# Patient Record
Sex: MALE | Race: OTHER | NOT HISPANIC OR LATINO
[De-identification: names, ages, dates, MRNs, and addresses within clinical notes are randomized per-mention and may not be internally consistent; named-entity substitution may affect disease eponyms.]

---

## 2019-11-21 ENCOUNTER — FORM ENCOUNTER (OUTPATIENT)
Age: 44
End: 2019-11-21

## 2019-11-21 PROBLEM — Z00.00 ENCOUNTER FOR PREVENTIVE HEALTH EXAMINATION: Status: ACTIVE | Noted: 2019-11-21

## 2019-11-22 ENCOUNTER — APPOINTMENT (OUTPATIENT)
Dept: ORTHOPEDIC SURGERY | Facility: HOSPITAL | Age: 44
End: 2019-11-22
Payer: COMMERCIAL

## 2019-11-22 ENCOUNTER — OUTPATIENT (OUTPATIENT)
Dept: OUTPATIENT SERVICES | Facility: HOSPITAL | Age: 44
LOS: 1 days | End: 2019-11-22
Payer: COMMERCIAL

## 2019-11-22 VITALS
BODY MASS INDEX: 30.53 KG/M2 | HEIGHT: 66 IN | WEIGHT: 190 LBS | DIASTOLIC BLOOD PRESSURE: 90 MMHG | SYSTOLIC BLOOD PRESSURE: 140 MMHG

## 2019-11-22 PROCEDURE — 72100 X-RAY EXAM L-S SPINE 2/3 VWS: CPT

## 2019-11-22 PROCEDURE — 72084 X-RAY EXAM ENTIRE SPI 6/> VW: CPT | Mod: 26

## 2019-11-22 PROCEDURE — 99204 OFFICE O/P NEW MOD 45 MIN: CPT

## 2019-11-22 PROCEDURE — 72084 X-RAY EXAM ENTIRE SPI 6/> VW: CPT

## 2019-11-22 PROCEDURE — 72082 X-RAY EXAM ENTIRE SPI 2/3 VW: CPT

## 2019-12-04 ENCOUNTER — APPOINTMENT (OUTPATIENT)
Dept: ORTHOPEDIC SURGERY | Facility: CLINIC | Age: 44
End: 2019-12-04
Payer: COMMERCIAL

## 2019-12-04 ENCOUNTER — APPOINTMENT (OUTPATIENT)
Dept: PHYSICAL MEDICINE AND REHAB | Facility: CLINIC | Age: 44
End: 2019-12-04
Payer: COMMERCIAL

## 2019-12-04 VITALS — WEIGHT: 190 LBS | BODY MASS INDEX: 30.53 KG/M2 | HEART RATE: 73 BPM | OXYGEN SATURATION: 97 % | HEIGHT: 66 IN

## 2019-12-04 DIAGNOSIS — Z86.79 PERSONAL HISTORY OF OTHER DISEASES OF THE CIRCULATORY SYSTEM: ICD-10-CM

## 2019-12-04 DIAGNOSIS — Z78.9 OTHER SPECIFIED HEALTH STATUS: ICD-10-CM

## 2019-12-04 PROCEDURE — 99204 OFFICE O/P NEW MOD 45 MIN: CPT

## 2019-12-04 NOTE — PHYSICAL EXAM
[FreeTextEntry1] : GEN: AAOx3, NAD.\par PSYCH: Normal mood and affect. Responds appropriately to commands.\par EYES: Sclerae Anicteric. No discharge. EOMI.\par RESP: Breathing unlabored.\par CV: DP pulses 2+ and equal. No varicosities noted.\par EXT: No C/C/E.\par SKIN: No lesions noted.\par STRENGTH: 5/5 bilateral hip flexors, knee extensors, knee flexors, ankle dorsiflexors, long toe extensors, ankle plantar flexors, hip extensors, hip abductors.\par TONE: Normal, No clonus.\par REFLEXES: 2+ symmetric patella, medial hamstring, achilles. Plantars downgoing bilaterally.\par SENS: Grossly intact to light touch bilateral lower extremities.\par INSP: Spine alignment is midline, with no evidence of scoliosis.\par STANCE: No Trendelenburg with single leg stance.\par GAIT: Non antalgic, normal reciprocating heel to toe\par LUMBAR ROM: Flexion limited w/ radicular Sx. Extension, side-bending, rotation, oblique extension all full and pain free.  \par HIP ROM: Full and pain free bilaterally.\par PALP: There is no tenderness over the midline spinous processes, paravertebral muscles, SIJ, or greater trochanters bilaterally.\par SPECIAL: SLR and Slump (+) bilaterally. FADIR, UMA negative bilaterally.

## 2019-12-04 NOTE — ASSESSMENT
[FreeTextEntry1] : Impression:\par 1. Bilateral L5 Radiculopathy\par 2. Lumbar Spinal Stenosis\par \par Plan: After review of the history, physical examination, and imaging, the patient's symptoms are consistent with Bilateral L5 Radiculopathy, with underlying spinal stenosis. The imaging results and diagnosis were discussed in detail with the patient. We discussed all the potential treatment options including physical therapy, oral medication, interventional spine procedures, and surgery; as well as alternative therapeutics such as acupuncture and massage. We also discussed the importance of weight loss, ergonomics, and posture in the long term management of the condition. At this time the patient had tried significant conservative treatment without substantial improvement and is interested in interventional options for symptom reduction. I have offered the patient the option of an epidural steroid injection. We discussed all the risks, benefits, alternative treatments, and prognosis. The patient expressed understanding and would like to move forward. We will schedule for the injection as well as follow up post-procedure. We will plan for Bilateral L5  TFESI. The patient expressed verbal understanding and is in agreement with the plan of care. All of the patient's questions and concerns were addressed during today's visit.\par

## 2019-12-04 NOTE — HISTORY OF PRESENT ILLNESS
[FreeTextEntry1] : Mr. KEY NICOLE is a very pleasant 44 year male who comes in for evaluation of his lower back pan that has been ongoing for 5 years without any specific injury or inciting event. The pain is located primarily in the lower back radiating down bilateral legs/feet intermittent in nature and described as constant pain. The pain is rated as 2/10 during today's visit, and ranges from 5-10/10. The patient's symptoms are aggravated by walking and standing for a prolonged time and alleviated by sitting and resting .Patient has tried physical therapy which has helped and Advil for pain as needed. The patient denies any night pain, numbness/tingling, weakness, or bowel/bladder dysfunction. The patient has no other complaints at this time.\par \par

## 2019-12-04 NOTE — DATA REVIEWED
[FreeTextEntry1] : MR LS 11/2019: Disc bulging superimposed on a congenitally narrow canal contributes to moderate spinal canal stenosis at L2-3 and L3-4. Central L4-5 disc herniation narrowing lateral recesses left greater than right with mild spinal canal stenosis at this level.\par

## 2019-12-06 NOTE — DISCUSSION/SUMMARY
[de-identified] : Discussed the results of the patient's history, physical exam, and imaging. Mr. Vazquez has been suffering from low back pain that radiates posteriorly down both lower extremities to his heels for approximately 5 years. Neurologically intact. MRI lumbar shows multilevel disc bulges superimposed on congenitally narrow canal resulting in L2/L3 and L3/L4 moderate central canal stenosis, mild/moderate bilateral foraminal stenosis. Explained to patient this may require surgical decompression in the future. I am recommending an evaluation by Dr. Mcdaniels for consideration of a caudal injection vs selective bilateral L5 TESI, will see today. The patient will follow up with me in approximately 2 months, sooner if there is an issue.  All questions were answered.

## 2019-12-06 NOTE — HISTORY OF PRESENT ILLNESS
[de-identified] : Follow up 12/4/19: Patient continues to have moderate low back pain that radiates posteriorly down both lower extremities to his heels. Symptoms unchanged since last visit, denies new neurologic symptoms. Here to review imaging.

## 2019-12-06 NOTE — PHYSICAL EXAM
[de-identified] : General: patient is well developed, well nourished, in no acute \par distress, alert and oriented x 3. \par \par Mood and affect: normal\par \par Respiratory: no respiratory distress noted\par \par Skin: no scars over spine, skin intact, no erythema, increased warmth\par \par Alignment:The spine is well compensated in the coronal and sagittal plane.  There is no asymmetry on the wright forward bend test\par \par Gait: The patient is able to toe walk and heel walk without difficulty. The patient is able to tandem gait without difficulty.\par \par Palpation: no tenderness to palpation spine or paraspinal region\par \par Range of motion: Lumbar spine ROM is restricted\par \par Neurologic Exam:\par Motor: Manual Muscle testing in the lower extremities is 5 out of 5 in all muscle groups. There is no evidence of muscular atrophy in the lower extremities. Sensory: Sensation to light touch is grossly intact in the lower extremities\par \par Reflexes: DTR are present and symmetric throughout, negative black bilaterally, negative inverted radial reflex bilaterally, no clonus, plantar responses are flexor\par \par Hip Exam: No pain with internal or external rotation of hips bilaterally\par \par Special tests: Straight leg raise test negative.  Cross straight leg test negative.  UMA test negative\par \par Vascular: Examination of the peripheral vascular system demonstrates no evidence of congestion or edema. no evidence of lymphedema bilateral lower extremities, pulses are present and symmetric in both lower extremities.\par \par  [de-identified] : MRI lumbar 11/2019: multilevel disc bulges superimposed on congenitally narrow canal resulting in L2/L3 and L3/L4 moderate central canal stenosis, mild/moderate bilateral foraminal stenosis\par \par XR thoracolumbar 11/2019: mild multilevel degenerative changes, no spondylolisthesis or dynamic instability, mild S-shaped thoracolumbar scoliosis, no acute fractures

## 2019-12-13 ENCOUNTER — APPOINTMENT (OUTPATIENT)
Dept: PHYSICAL MEDICINE AND REHAB | Facility: CLINIC | Age: 44
End: 2019-12-13
Payer: COMMERCIAL

## 2019-12-13 PROCEDURE — 64483 NJX AA&/STRD TFRM EPI L/S 1: CPT | Mod: 50

## 2020-01-13 ENCOUNTER — APPOINTMENT (OUTPATIENT)
Dept: PHYSICAL MEDICINE AND REHAB | Facility: CLINIC | Age: 45
End: 2020-01-13
Payer: COMMERCIAL

## 2020-01-13 VITALS
OXYGEN SATURATION: 97 % | HEART RATE: 76 BPM | HEIGHT: 66 IN | BODY MASS INDEX: 30.53 KG/M2 | WEIGHT: 190 LBS | RESPIRATION RATE: 16 BRPM

## 2020-01-13 DIAGNOSIS — M51.17 INTERVERTEBRAL DISC DISORDERS WITH RADICULOPATHY, LUMBOSACRAL REGION: ICD-10-CM

## 2020-01-13 PROCEDURE — 99214 OFFICE O/P EST MOD 30 MIN: CPT

## 2020-02-06 NOTE — DISCUSSION/SUMMARY
[de-identified] : Discussed the results of the patient's history, physical exam, and imaging. Mr. Vazquez has been suffering from low back pain that radiates posteriorly down both lower extremities to his heels for approximately 5 years. Neurologically intact. I am recommending an MRI lumbar without contrast, order given. The patient will follow up with Dr. Carrillo and myself after imaging is completed, sooner if there is an issue. All questions answered.

## 2020-02-06 NOTE — HISTORY OF PRESENT ILLNESS
[de-identified] : Mr. NICOLE is a very pleasant 44 year old male who complains of low back pain for 5 years, atraumatic. On initial presentation symptoms were as follows: Patient described the pain as constant.  Patient rated the pain as moderate.  The pain localized to lower back.  The pain radiates posteriorly down both lower extremities to her heels.  Patient  denies extremity numbness and paresthesias. \par \par The patient reports no loss of hand dexterity.\par The patient states there no loss of balance when walking.\par There no sensory loss in the arms or legs\par The patent no difficulty with urination.\par \par The patient no history of previous spine surgery.\par \par The patient has no history of unexpected weight loss, no history of active cancer, no history bladder or bowel dysfunction, no night pain, no fevers or chills.\par \par The past medical history, surgical history, family history, allergies, medications, 10+ point review of systems, family history and social history were reviewed and non contributory.\par \par \par  \par

## 2020-02-06 NOTE — PHYSICAL EXAM
[de-identified] : General: patient is well developed, well nourished, in no acute \par distress, alert and oriented x 3. \par \par Mood and affect: normal\par \par Respiratory: no respiratory distress noted\par \par Skin: no scars over spine, skin intact, no erythema, increased warmth\par \par Alignment:The spine is well compensated in the coronal and sagittal plane. There is no asymmetry on the wright forward bend test\par \par Gait: The patient is able to toe walk and heel walk without difficulty. The patient is able to tandem gait without difficulty.\par \par Palpation: no tenderness to palpation spine or paraspinal region\par \par Range of motion: Lumbar spine ROM is restricted\par \par Neurologic Exam:\par Motor: Manual Muscle testing in the lower extremities is 5 out of 5 in all muscle groups. There is no evidence of muscular atrophy in the lower extremities. Sensory: Sensation to light touch is grossly intact in the lower extremities\par \par Reflexes: DTR are present and symmetric throughout, negative black bilaterally, negative inverted radial reflex bilaterally, no clonus, plantar responses are flexor\par \par Hip Exam: No pain with internal or external rotation of hips bilaterally\par \par Special tests: Straight leg raise test negative. Cross straight leg test negative. UMA test negative\par \par Vascular: Examination of the peripheral vascular system demonstrates no evidence of congestion or edema. no evidence of lymphedema bilateral lower extremities, pulses are present and symmetric in both lower extremities.\par  [de-identified] : XR thoracolumbar 11/2019: mild multilevel degenerative changes, no spondylolisthesis or dynamic instability, mild S-shaped thoracolumbar scoliosis, no acute fractures. \par

## 2020-05-05 ENCOUNTER — APPOINTMENT (OUTPATIENT)
Dept: ORTHOPEDIC SURGERY | Facility: CLINIC | Age: 45
End: 2020-05-05
Payer: COMMERCIAL

## 2020-05-05 PROCEDURE — 99215 OFFICE O/P EST HI 40 MIN: CPT | Mod: 57

## 2020-05-05 NOTE — PHYSICAL EXAM
[de-identified] : General: patient is well developed, well nourished, in no acute \par distress, alert and oriented x 3. \par \par Mood and affect: normal\par \par Respiratory: no respiratory distress noted\par \par Skin: no scars over spine, skin intact, no erythema, increased warmth\par \par Alignment:The spine is well compensated in the coronal and sagittal plane. There is no asymmetry on the wright forward bend test\par \par Gait: The patient is able to toe walk and heel walk without difficulty. The patient is able to tandem gait without difficulty.\par \par Palpation: no tenderness to palpation spine or paraspinal region\par \par Range of motion: Lumbar spine ROM is restricted\par \par Neurologic Exam:\par Motor: Manual Muscle testing in the lower extremities is 5 out of 5 in all muscle groups. There is no evidence of muscular atrophy in the lower extremities. Sensory: Sensation to light touch is grossly intact in the lower extremities\par \par Reflexes: DTR are present and symmetric throughout, negative black bilaterally, negative inverted radial reflex bilaterally, no clonus, plantar responses are flexor\par \par Hip Exam: No pain with internal or external rotation of hips bilaterally\par \par Special tests: Straight leg raise test negative. Cross straight leg test negative. UMA test negative\par \par Vascular: Examination of the peripheral vascular system demonstrates no evidence of congestion or edema. no evidence of lymphedema bilateral lower extremities, pulses are present and symmetric in both lower extremities. [de-identified] : MRI lumbar 11/2019: multilevel disc bulges superimposed on congenitally narrow canal resulting in L2/L3 and L3/L4 moderate central canal stenosis, mild/moderate bilateral foraminal stenosis\par \par XR thoracolumbar 11/2019: mild multilevel degenerative changes, no spondylolisthesis or dynamic instability, mild S-shaped thoracolumbar scoliosis, no acute fractures.

## 2020-05-05 NOTE — DISCUSSION/SUMMARY
[de-identified] : Discussed the results of the patient's history, physical exam, and imaging. Mr. Vazquez has been suffering from low back pain that radiates posteriorly down both lower extremities to his heels for approximately 5 years. Now worsening and severe.  unable to work.  difficulty with ADLs.  MRI lumbar shows multilevel disc bulges superimposed on congenitally narrow canal resulting in L2/L3 and L3/L4 moderate central canal stenosis, mild/moderate bilateral foraminal stenosis. There is a disc herniation at L4/5 with compression of the bilateral L5 nerve roots additionally.  He had temporary 100% relief from TESI.  He does not wish to have any more injections.  wishes to proceed with surgery.  The patient is a candidate for L2-5 Laminectomy, partial facetectomy, possible fusion, instrumentation, navigation.  Further non operative treatment would include no surgery.  Risks, benefits, alternatives were discussed with the patient at great length, including but not limited to, bleeding, infection, persistent neurologic deficit, worsening pain, worsening neurologic status, pseudoarthrosis, hardware migration/failure, medical complications (including but not limited to cardiac, pulmonary, renal), and the need for further surgery.  The patient asked a number of cogent questions.  All questions were answered.  The patient demonstrated understanding of the risks, benefits, and alternatives.  The patient has elected to proceed with surgery.  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  He understands and still agrees to proceed.

## 2020-05-05 NOTE — HISTORY OF PRESENT ILLNESS
[de-identified] : Follow up 5/5/20: Patient continues to have severe low back pain that radiates posteriorly down both lower extremities to his heels, much worse on the right.  feels numbness and tingling in the right lower extremity down to the foot when he walks. Had injection with dr. jones, bilateral L5 TESI with 100% relief for 1-2 months.  This was done in 12/2019.  reports that the symptoms have worsened and now are worse than pre-injection.  unable to work due to pain.\par \par The patient reports no loss of hand dexterity.\par The patient states there no loss of balance when walking.\par The patent no difficulty with urination.\par \par The patient no history of previous spine surgery.\par \par The patient has no history of unexpected weight loss, no history of active cancer, no history bladder or bowel dysfunction, no night pain, no fevers or chills.\par \par The past medical history, surgical history, family history, allergies, medications, review of systems, family history and social history were reviewed and non contributory.

## 2020-07-29 ENCOUNTER — OUTPATIENT (OUTPATIENT)
Dept: OUTPATIENT SERVICES | Facility: HOSPITAL | Age: 45
LOS: 1 days | End: 2020-07-29
Payer: COMMERCIAL

## 2020-07-29 ENCOUNTER — RESULT REVIEW (OUTPATIENT)
Age: 45
End: 2020-07-29

## 2020-07-29 PROCEDURE — 71046 X-RAY EXAM CHEST 2 VIEWS: CPT | Mod: 26

## 2020-07-29 PROCEDURE — 71046 X-RAY EXAM CHEST 2 VIEWS: CPT

## 2020-08-14 ENCOUNTER — APPOINTMENT (OUTPATIENT)
Dept: ORTHOPEDIC SURGERY | Facility: HOSPITAL | Age: 45
End: 2020-08-14

## 2021-08-18 ENCOUNTER — APPOINTMENT (OUTPATIENT)
Dept: ORTHOPEDIC SURGERY | Facility: CLINIC | Age: 46
End: 2021-08-18
Payer: COMMERCIAL

## 2021-08-18 ENCOUNTER — NON-APPOINTMENT (OUTPATIENT)
Age: 46
End: 2021-08-18

## 2021-08-18 DIAGNOSIS — R20.2 PARESTHESIA OF SKIN: ICD-10-CM

## 2021-08-18 PROCEDURE — 99214 OFFICE O/P EST MOD 30 MIN: CPT

## 2021-08-30 NOTE — DISCUSSION/SUMMARY
[de-identified] : Discussed my findings with the patient. Mr. Vazquez reports worsening low back pain and bilateral lower extremity radiating pain over the past year. Patient was scheduled for an L2-L5 laminectomy with possible fusion with instrumentation last year. Patient cancelled as wanted to continue conservative management. Symptoms now significantly disabling, would like to consider surgical intervention. As imaging is now over 1 year old, recommending updated XR lumbar and MRI lumbar without contrast, orders given. Patient also reports bilateral hand paresthesias and gait instability. Recommending MRI cervical and thoracic without contrast prior to surgical intervention for lumbar spine to rule out cord compression, orders given. Patient will follow up with me after imaging is completed, sooner if there is an issue. All questions answered.

## 2021-08-30 NOTE — PHYSICAL EXAM
[de-identified] : General: patient is well developed, well nourished, in no acute \par distress, alert and oriented x 3. \par \par Mood and affect: normal\par \par Respiratory: no respiratory distress noted\par \par Skin: no scars over spine, skin intact, no erythema, increased warmth\par \par Alignment:The spine is well compensated in the coronal and sagittal plane. There is no asymmetry on the wright forward bend test\par \par Gait: The patient is able to toe walk and heel walk without difficulty. The patient is able to tandem gait without difficulty.\par \par Palpation: no tenderness to palpation spine or paraspinal region\par \par Range of motion: Lumbar spine ROM is restricted\par \par Neurologic Exam:\par Motor: Manual Muscle testing in the lower extremities is 5 out of 5 in all muscle groups. There is no evidence of muscular atrophy in the lower extremities. Sensory: Sensation to light touch is grossly intact in the lower extremities\par \par Reflexes: DTR are present and symmetric throughout, negative black bilaterally, negative inverted radial reflex bilaterally, no clonus, plantar responses are flexor\par \par Hip Exam: No pain with internal or external rotation of hips bilaterally\par \par Special tests: Straight leg raise test negative. Cross straight leg test negative. UMA test negative\par \par Vascular: Examination of the peripheral vascular system demonstrates no evidence of congestion or edema. no evidence of lymphedema bilateral lower extremities, pulses are present and symmetric in both lower extremities.\par  [de-identified] : MRI lumbar 11/2019: multilevel disc bulges superimposed on congenitally narrow canal resulting in L2/L3 and L3/L4 moderate central canal stenosis, mild/moderate bilateral foraminal stenosis\par \par XR thoracolumbar 11/2019: mild multilevel degenerative changes, no spondylolisthesis or dynamic instability, mild S-shaped thoracolumbar scoliosis, no acute fractures. \par

## 2021-08-30 NOTE — HISTORY OF PRESENT ILLNESS
[de-identified] : Follow up 8/18/21: Patient presents for follow up. He reports low back pain that radiates posteriorly down both lower extremities to his heels. Also with bilateral groin pain. Lower extremity pain more severe than low back pain. Pain gradually worsening over the past year. Also reports paresthesias in both hands over the past several months. Significantly disabled by symptoms. Works construction, current symptoms interfering with work. \par \par Follow up 5/2020: Mr. NICOLE is a very pleasant 44 year old male who complains of low back pain for 5 years, atraumatic. On initial presentation symptoms were as follows: Patient described the pain as constant.  Patient rated the pain as moderate.  The pain localized to lower back.  The pain radiates posteriorly down both lower extremities to his heels.  Patient  denies extremity numbness and paresthesias. \par \par The patient reports no loss of hand dexterity.\par The patient states there no loss of balance when walking.\par There no sensory loss in the arms or legs\par The patent no difficulty with urination.\par \par The patient no history of previous spine surgery.\par \par The patient has no history of unexpected weight loss, no history of active cancer, no history bladder or bowel dysfunction, no night pain, no fevers or chills.\par \par The past medical history, surgical history, family history, allergies, medications, 10+ point review of systems, family history and social history were reviewed and non contributory.\par \par \par  \par

## 2021-09-15 ENCOUNTER — TRANSCRIPTION ENCOUNTER (OUTPATIENT)
Age: 46
End: 2021-09-15

## 2021-09-15 ENCOUNTER — APPOINTMENT (OUTPATIENT)
Dept: ORTHOPEDIC SURGERY | Facility: CLINIC | Age: 46
End: 2021-09-15
Payer: COMMERCIAL

## 2021-09-15 DIAGNOSIS — G89.29 LUMBAGO WITH SCIATICA, LEFT SIDE: ICD-10-CM

## 2021-09-15 DIAGNOSIS — M54.42 LUMBAGO WITH SCIATICA, LEFT SIDE: ICD-10-CM

## 2021-09-15 DIAGNOSIS — M54.41 LUMBAGO WITH SCIATICA, LEFT SIDE: ICD-10-CM

## 2021-09-15 DIAGNOSIS — Z01.818 ENCOUNTER FOR OTHER PREPROCEDURAL EXAMINATION: ICD-10-CM

## 2021-09-15 PROCEDURE — 99215 OFFICE O/P EST HI 40 MIN: CPT | Mod: 57

## 2021-09-16 LAB
MRSA SPEC QL CULT: NOT DETECTED
STAPH AUREUS (SA): NOT DETECTED

## 2021-09-28 NOTE — PHYSICAL EXAM
[de-identified] : General: patient is well developed, well nourished, in no acute \par distress, alert and oriented x 3. \par \par Mood and affect: normal\par \par Respiratory: no respiratory distress noted\par \par Skin: no scars over spine, skin intact, no erythema, increased warmth\par \par Alignment:The spine is well compensated in the coronal and sagittal plane. There is no asymmetry on the wright forward bend test\par \par Gait: The patient is able to toe walk and heel walk without difficulty. The patient is able to tandem gait without difficulty.\par \par Palpation: no tenderness to palpation spine or paraspinal region\par \par Range of motion: Lumbar spine ROM is restricted\par \par Neurologic Exam:\par Motor: Manual Muscle testing in the lower extremities is 5 out of 5 in all muscle groups. There is no evidence of muscular atrophy in the lower extremities. Sensory: Sensation to light touch is grossly intact in the lower extremities\par \par Reflexes: DTR are present and symmetric throughout, negative black bilaterally, negative inverted radial reflex bilaterally, no clonus, plantar responses are flexor\par \par Hip Exam: No pain with internal or external rotation of hips bilaterally\par \par Special tests: Straight leg raise test negative. Cross straight leg test negative. UMA test negative\par \par Vascular: Examination of the peripheral vascular system demonstrates no evidence of congestion or edema. no evidence of lymphedema bilateral lower extremities, pulses are present and symmetric in both lower extremities.\par  [de-identified] : MRI lumbar 9/2021: multilevel disc bulges and posterior element hypertrophy with L2/L3 mild central and moderate bilateral foraminal stenosis; L3/L4 and L4/L5 mild/moderate central and moderate/severe bilateral foraminal stenosis\par \par MRI lumbar 11/2019: multilevel disc bulges superimposed on congenitally narrow canal resulting in L2/L3 and L3/L4 moderate central canal stenosis, mild/moderate bilateral foraminal stenosis\par \par XR thoracolumbar 11/2019: mild multilevel degenerative changes, no spondylolisthesis or dynamic instability, mild S-shaped thoracolumbar scoliosis, no acute fractures. \par

## 2021-09-28 NOTE — DISCUSSION/SUMMARY
[de-identified] : Discussed my findings with the patient. Mr. Vazquez reports worsening low back pain and bilateral lower extremity radiating pain, progressing over the past year. 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 Has failed extensive conservative management, including physical therapy, steroid injections, and oral medication. The patient is a candidate for L2-L5 laminectomy, possible posterior spinal fusion with instrumentation.  Given his congenitally narrow spinal canal and narrowly placed facets, in concert with his severe spinal stenosis, the necessary neural decompression may require resection of more than 50% of the facet joints.  This would create iatrogenic instability and would require spinal fusion and instrumentation at the same time.  Further non operative treatment would include no surgery.  Risks, benefits, alternatives were discussed with the patient at great length, including but not limited to, bleeding, infection, persistent neurologic deficit, worsening pain, worsening neurologic status, pseudoarthrosis, hardware migration/failure, cerebrospinal fluid leak, medical complications (including but not limited to cardiac, pulmonary, renal), and the need for further surgery.  The patient asked a number of cogent questions.  All questions were answered.  The patient demonstrated understanding of the risks, benefits, and alternatives.  The patient has elected to proceed with surgery. He will need medical clearance. He will need a CT lumbar without contrast for preoperative planning.\par \par

## 2021-09-28 NOTE — HISTORY OF PRESENT ILLNESS
[de-identified] : Follow up 9/15/21: Patient presents for follow up. Continues to have low back pain that radiates primarily laterally down both lower extremities to his heels. Also with bilateral groin pain. Lower extremity pain more severe than low back pain. Significantly disabled by symptoms. Works construction, current symptoms interfering with work and ADLs. Denies new neurologic symptoms. Here to review updated imaging. \par

## 2021-10-05 ENCOUNTER — LABORATORY RESULT (OUTPATIENT)
Age: 46
End: 2021-10-05

## 2021-10-05 ENCOUNTER — OUTPATIENT (OUTPATIENT)
Dept: OUTPATIENT SERVICES | Facility: HOSPITAL | Age: 46
LOS: 1 days | End: 2021-10-05
Payer: COMMERCIAL

## 2021-10-05 ENCOUNTER — RESULT REVIEW (OUTPATIENT)
Age: 46
End: 2021-10-05

## 2021-10-05 PROCEDURE — 71046 X-RAY EXAM CHEST 2 VIEWS: CPT | Mod: 26

## 2021-10-05 PROCEDURE — 71046 X-RAY EXAM CHEST 2 VIEWS: CPT

## 2021-10-07 ENCOUNTER — TRANSCRIPTION ENCOUNTER (OUTPATIENT)
Age: 46
End: 2021-10-07

## 2021-10-07 VITALS
HEIGHT: 67 IN | TEMPERATURE: 97 F | WEIGHT: 212.08 LBS | SYSTOLIC BLOOD PRESSURE: 144 MMHG | HEART RATE: 70 BPM | OXYGEN SATURATION: 97 % | DIASTOLIC BLOOD PRESSURE: 82 MMHG | RESPIRATION RATE: 16 BRPM

## 2021-10-07 RX ORDER — POVIDONE-IODINE 5 %
1 AEROSOL (ML) TOPICAL ONCE
Refills: 0 | Status: DISCONTINUED | OUTPATIENT
Start: 2021-10-08 | End: 2021-10-08

## 2021-10-07 NOTE — H&P ADULT - NSHPLABSRESULTS_GEN_ALL_CORE
COVID PCR neg 10/5 COVID PCR neg 10/5  Preop cbc, bmp, pt/inr, ptt, ua wnl  Cr 0.9  preop cxr wnl per clearance  preop ekg wnl per clearance  3M DOS

## 2021-10-07 NOTE — H&P ADULT - PROBLEM SELECTOR PLAN 1
Admit to Ortho  For elective L2-5 Lami possible fusion.  Cleared by Admit to Ortho  For elective L2-5 Lami possible fusion.  Cleared by Dr. Márquez

## 2021-10-07 NOTE — H&P ADULT - NSHPPHYSICALEXAM_GEN_ALL_CORE
Back decreased ROM 2/2 pain  Rest of PE per MD clearance Lumbar spine decreased ROM 2/2 pain  Skin warm and well perfused, no visible wounds/erythema/ecchymoses  EHL/FHL/TA/GS 5/5 motor strength bilateral lower extremities   SLT in tact to distal bilateral lower extremities   DP pulses palpable bilaterally   Remainder of PE per MD clearance

## 2021-10-07 NOTE — H&P ADULT - NSICDXPASTMEDICALHX_GEN_ALL_CORE_FT
PAST MEDICAL HISTORY:  H/O low back pain     HLD (hyperlipidemia)     HTN (hypertension)     Spinal stenosis

## 2021-10-08 ENCOUNTER — INPATIENT (INPATIENT)
Facility: HOSPITAL | Age: 46
LOS: 3 days | Discharge: HOME | DRG: 519 | End: 2021-10-12
Attending: ORTHOPAEDIC SURGERY | Admitting: ORTHOPAEDIC SURGERY
Payer: COMMERCIAL

## 2021-10-08 ENCOUNTER — APPOINTMENT (OUTPATIENT)
Dept: ORTHOPEDIC SURGERY | Facility: HOSPITAL | Age: 46
End: 2021-10-08

## 2021-10-08 DIAGNOSIS — M48.061 SPINAL STENOSIS, LUMBAR REGION WITHOUT NEUROGENIC CLAUDICATION: ICD-10-CM

## 2021-10-08 DIAGNOSIS — Z90.49 ACQUIRED ABSENCE OF OTHER SPECIFIED PARTS OF DIGESTIVE TRACT: Chronic | ICD-10-CM

## 2021-10-08 LAB
BLD GP AB SCN SERPL QL: NEGATIVE — SIGNIFICANT CHANGE UP
RH IG SCN BLD-IMP: POSITIVE — SIGNIFICANT CHANGE UP

## 2021-10-08 PROCEDURE — 63048 LAM FACETEC &FORAMOT EA ADDL: CPT

## 2021-10-08 PROCEDURE — 63047 LAM FACETEC & FORAMOT LUMBAR: CPT

## 2021-10-08 RX ORDER — CHOLECALCIFEROL (VITAMIN D3) 125 MCG
1 CAPSULE ORAL
Qty: 0 | Refills: 0 | DISCHARGE

## 2021-10-08 RX ORDER — HYDROMORPHONE HYDROCHLORIDE 2 MG/ML
0.5 INJECTION INTRAMUSCULAR; INTRAVENOUS; SUBCUTANEOUS
Refills: 0 | Status: DISCONTINUED | OUTPATIENT
Start: 2021-10-08 | End: 2021-10-12

## 2021-10-08 RX ORDER — APREPITANT 80 MG/1
40 CAPSULE ORAL ONCE
Refills: 0 | Status: COMPLETED | OUTPATIENT
Start: 2021-10-08 | End: 2021-10-08

## 2021-10-08 RX ORDER — BUPIVACAINE 13.3 MG/ML
20 INJECTION, SUSPENSION, LIPOSOMAL INFILTRATION ONCE
Refills: 0 | Status: DISCONTINUED | OUTPATIENT
Start: 2021-10-08 | End: 2021-10-08

## 2021-10-08 RX ORDER — GABAPENTIN 400 MG/1
300 CAPSULE ORAL ONCE
Refills: 0 | Status: COMPLETED | OUTPATIENT
Start: 2021-10-08 | End: 2021-10-08

## 2021-10-08 RX ORDER — METHOCARBAMOL 500 MG/1
500 TABLET, FILM COATED ORAL EVERY 8 HOURS
Refills: 0 | Status: DISCONTINUED | OUTPATIENT
Start: 2021-10-08 | End: 2021-10-12

## 2021-10-08 RX ORDER — INFLUENZA VIRUS VACCINE 15; 15; 15; 15 UG/.5ML; UG/.5ML; UG/.5ML; UG/.5ML
0.5 SUSPENSION INTRAMUSCULAR ONCE
Refills: 0 | Status: DISCONTINUED | OUTPATIENT
Start: 2021-10-08 | End: 2021-10-08

## 2021-10-08 RX ORDER — ACETAMINOPHEN 500 MG
1000 TABLET ORAL ONCE
Refills: 0 | Status: COMPLETED | OUTPATIENT
Start: 2021-10-08 | End: 2021-10-08

## 2021-10-08 RX ORDER — SODIUM CHLORIDE 9 MG/ML
1000 INJECTION, SOLUTION INTRAVENOUS
Refills: 0 | Status: DISCONTINUED | OUTPATIENT
Start: 2021-10-08 | End: 2021-10-12

## 2021-10-08 RX ORDER — SENNA PLUS 8.6 MG/1
2 TABLET ORAL AT BEDTIME
Refills: 0 | Status: DISCONTINUED | OUTPATIENT
Start: 2021-10-08 | End: 2021-10-12

## 2021-10-08 RX ORDER — AZILSARTAN KAMEDOXOMIL 40 MG/1
1 TABLET ORAL
Qty: 0 | Refills: 0 | DISCHARGE

## 2021-10-08 RX ORDER — MAGNESIUM HYDROXIDE 400 MG/1
30 TABLET, CHEWABLE ORAL DAILY
Refills: 0 | Status: DISCONTINUED | OUTPATIENT
Start: 2021-10-08 | End: 2021-10-12

## 2021-10-08 RX ORDER — CEFAZOLIN SODIUM 1 G
2000 VIAL (EA) INJECTION EVERY 8 HOURS
Refills: 0 | Status: DISCONTINUED | OUTPATIENT
Start: 2021-10-08 | End: 2021-10-08

## 2021-10-08 RX ORDER — ACETAMINOPHEN 500 MG
975 TABLET ORAL EVERY 8 HOURS
Refills: 0 | Status: DISCONTINUED | OUTPATIENT
Start: 2021-10-08 | End: 2021-10-11

## 2021-10-08 RX ORDER — FAMOTIDINE 10 MG/ML
20 INJECTION INTRAVENOUS EVERY 12 HOURS
Refills: 0 | Status: DISCONTINUED | OUTPATIENT
Start: 2021-10-08 | End: 2021-10-12

## 2021-10-08 RX ORDER — HYDROMORPHONE HYDROCHLORIDE 2 MG/ML
0.5 INJECTION INTRAMUSCULAR; INTRAVENOUS; SUBCUTANEOUS EVERY 4 HOURS
Refills: 0 | Status: DISCONTINUED | OUTPATIENT
Start: 2021-10-08 | End: 2021-10-12

## 2021-10-08 RX ORDER — OXYCODONE HYDROCHLORIDE 5 MG/1
5 TABLET ORAL EVERY 4 HOURS
Refills: 0 | Status: DISCONTINUED | OUTPATIENT
Start: 2021-10-08 | End: 2021-10-10

## 2021-10-08 RX ORDER — FOLIC ACID 0.8 MG
1 TABLET ORAL DAILY
Refills: 0 | Status: DISCONTINUED | OUTPATIENT
Start: 2021-10-08 | End: 2021-10-12

## 2021-10-08 RX ORDER — ONDANSETRON 8 MG/1
4 TABLET, FILM COATED ORAL EVERY 6 HOURS
Refills: 0 | Status: DISCONTINUED | OUTPATIENT
Start: 2021-10-08 | End: 2021-10-12

## 2021-10-08 RX ORDER — OXYCODONE HYDROCHLORIDE 5 MG/1
10 TABLET ORAL EVERY 4 HOURS
Refills: 0 | Status: DISCONTINUED | OUTPATIENT
Start: 2021-10-08 | End: 2021-10-10

## 2021-10-08 RX ORDER — ICOSAPENT ETHYL 500 MG/1
2 CAPSULE, LIQUID FILLED ORAL
Qty: 0 | Refills: 0 | DISCHARGE

## 2021-10-08 RX ORDER — CHLORHEXIDINE GLUCONATE 213 G/1000ML
1 SOLUTION TOPICAL ONCE
Refills: 0 | Status: COMPLETED | OUTPATIENT
Start: 2021-10-08 | End: 2021-10-08

## 2021-10-08 RX ORDER — CEFAZOLIN SODIUM 1 G
2000 VIAL (EA) INJECTION EVERY 8 HOURS
Refills: 0 | Status: COMPLETED | OUTPATIENT
Start: 2021-10-08 | End: 2021-10-08

## 2021-10-08 RX ADMIN — METHOCARBAMOL 500 MILLIGRAM(S): 500 TABLET, FILM COATED ORAL at 21:52

## 2021-10-08 RX ADMIN — Medication 50 MILLIGRAM(S): at 21:52

## 2021-10-08 RX ADMIN — Medication 1000 MILLIGRAM(S): at 07:26

## 2021-10-08 RX ADMIN — Medication 100 MILLIGRAM(S): at 23:55

## 2021-10-08 RX ADMIN — SENNA PLUS 2 TABLET(S): 8.6 TABLET ORAL at 21:52

## 2021-10-08 RX ADMIN — FAMOTIDINE 20 MILLIGRAM(S): 10 INJECTION INTRAVENOUS at 17:08

## 2021-10-08 RX ADMIN — Medication 100 MILLIGRAM(S): at 17:07

## 2021-10-08 RX ADMIN — Medication 975 MILLIGRAM(S): at 22:52

## 2021-10-08 RX ADMIN — APREPITANT 40 MILLIGRAM(S): 80 CAPSULE ORAL at 07:25

## 2021-10-08 RX ADMIN — CHLORHEXIDINE GLUCONATE 1 APPLICATION(S): 213 SOLUTION TOPICAL at 07:14

## 2021-10-08 RX ADMIN — Medication 975 MILLIGRAM(S): at 14:14

## 2021-10-08 RX ADMIN — Medication 975 MILLIGRAM(S): at 21:52

## 2021-10-08 RX ADMIN — METHOCARBAMOL 500 MILLIGRAM(S): 500 TABLET, FILM COATED ORAL at 17:08

## 2021-10-08 RX ADMIN — GABAPENTIN 300 MILLIGRAM(S): 400 CAPSULE ORAL at 07:26

## 2021-10-08 NOTE — PHYSICAL THERAPY INITIAL EVALUATION ADULT - PLANNED THERAPY INTERVENTIONS, PT EVAL
balance training/bed mobility training/gait training/manual therapy techniques/neuromuscular re-education/postural re-education/strengthening/stretching/transfer training

## 2021-10-08 NOTE — PROGRESS NOTE ADULT - SUBJECTIVE AND OBJECTIVE BOX
Orthopaedics Post Op Check    Procedure: L2-L5 Laminectomy  Surgeon: Dr. Carrillo     Pt comfortable, without complaints  Denies CP, SOB, N/V, numbness/tingling     Vital Signs Last 24 Hrs  T(C): 36.2 (08 Oct 2021 12:30), Max: 36.2 (08 Oct 2021 12:30)  T(F): 97.2 (08 Oct 2021 12:30), Max: 97.2 (08 Oct 2021 12:30)  HR: 84 (08 Oct 2021 12:45) (82 - 84)  BP: 113/58 (08 Oct 2021 12:45) (113/58 - 116/58)  BP(mean): 82 (08 Oct 2021 12:45) (80 - 84)  RR: 13 (08 Oct 2021 12:45) (13 - 14)  SpO2: 99% (08 Oct 2021 12:45) (99% - 100%)  AVSS, NAD    Dressing C/D/I; HV x 1 holding suction  General: Pt Alert and oriented     Pulses: DP pulses palpable bilaterally   Sensation: SLT in tact to distal bilateral lower extremities   Motor: EHL/FHL/TA/GS 5/5 motor strength bilateral lower extremities          Post op XR: fluoroscopy utilized intra op to confirm level     A/P: 46yMale POD#0 s/p Lami L2-L5  - Stable  - Pain Control  - DVT ppx: SCDs  - Post op abx: Ancef  - PT, WBS: WBAT  - F/U AM Labs

## 2021-10-09 ENCOUNTER — TRANSCRIPTION ENCOUNTER (OUTPATIENT)
Age: 46
End: 2021-10-09

## 2021-10-09 LAB
ANION GAP SERPL CALC-SCNC: 9 MMOL/L — SIGNIFICANT CHANGE UP (ref 5–17)
BUN SERPL-MCNC: 13 MG/DL — SIGNIFICANT CHANGE UP (ref 7–23)
CALCIUM SERPL-MCNC: 9.2 MG/DL — SIGNIFICANT CHANGE UP (ref 8.4–10.5)
CHLORIDE SERPL-SCNC: 107 MMOL/L — SIGNIFICANT CHANGE UP (ref 96–108)
CO2 SERPL-SCNC: 25 MMOL/L — SIGNIFICANT CHANGE UP (ref 22–31)
COVID-19 SPIKE DOMAIN AB INTERP: POSITIVE
COVID-19 SPIKE DOMAIN ANTIBODY RESULT: >250 U/ML — HIGH
CREAT SERPL-MCNC: 0.76 MG/DL — SIGNIFICANT CHANGE UP (ref 0.5–1.3)
GLUCOSE SERPL-MCNC: 103 MG/DL — HIGH (ref 70–99)
HCT VFR BLD CALC: 35.6 % — LOW (ref 39–50)
HGB BLD-MCNC: 11.5 G/DL — LOW (ref 13–17)
MCHC RBC-ENTMCNC: 29 PG — SIGNIFICANT CHANGE UP (ref 27–34)
MCHC RBC-ENTMCNC: 32.3 GM/DL — SIGNIFICANT CHANGE UP (ref 32–36)
MCV RBC AUTO: 89.9 FL — SIGNIFICANT CHANGE UP (ref 80–100)
NRBC # BLD: 0 /100 WBCS — SIGNIFICANT CHANGE UP (ref 0–0)
PLATELET # BLD AUTO: 212 K/UL — SIGNIFICANT CHANGE UP (ref 150–400)
POTASSIUM SERPL-MCNC: 3.5 MMOL/L — SIGNIFICANT CHANGE UP (ref 3.5–5.3)
POTASSIUM SERPL-SCNC: 3.5 MMOL/L — SIGNIFICANT CHANGE UP (ref 3.5–5.3)
RBC # BLD: 3.96 M/UL — LOW (ref 4.2–5.8)
RBC # FLD: 12.2 % — SIGNIFICANT CHANGE UP (ref 10.3–14.5)
SARS-COV-2 IGG+IGM SERPL QL IA: >250 U/ML — HIGH
SARS-COV-2 IGG+IGM SERPL QL IA: POSITIVE
SODIUM SERPL-SCNC: 141 MMOL/L — SIGNIFICANT CHANGE UP (ref 135–145)
WBC # BLD: 10.08 K/UL — SIGNIFICANT CHANGE UP (ref 3.8–10.5)
WBC # FLD AUTO: 10.08 K/UL — SIGNIFICANT CHANGE UP (ref 3.8–10.5)

## 2021-10-09 RX ORDER — METHOCARBAMOL 500 MG/1
1 TABLET, FILM COATED ORAL
Qty: 21 | Refills: 0
Start: 2021-10-09 | End: 2021-10-15

## 2021-10-09 RX ORDER — OXYCODONE HYDROCHLORIDE 5 MG/1
1 TABLET ORAL
Qty: 42 | Refills: 0
Start: 2021-10-09 | End: 2021-10-15

## 2021-10-09 RX ORDER — ACETAMINOPHEN 500 MG
2 TABLET ORAL
Qty: 100 | Refills: 0
Start: 2021-10-09

## 2021-10-09 RX ADMIN — Medication 975 MILLIGRAM(S): at 06:04

## 2021-10-09 RX ADMIN — SENNA PLUS 2 TABLET(S): 8.6 TABLET ORAL at 22:09

## 2021-10-09 RX ADMIN — Medication 50 MILLIGRAM(S): at 14:25

## 2021-10-09 RX ADMIN — METHOCARBAMOL 500 MILLIGRAM(S): 500 TABLET, FILM COATED ORAL at 22:08

## 2021-10-09 RX ADMIN — METHOCARBAMOL 500 MILLIGRAM(S): 500 TABLET, FILM COATED ORAL at 06:04

## 2021-10-09 RX ADMIN — Medication 975 MILLIGRAM(S): at 07:04

## 2021-10-09 RX ADMIN — METHOCARBAMOL 500 MILLIGRAM(S): 500 TABLET, FILM COATED ORAL at 14:25

## 2021-10-09 RX ADMIN — Medication 1 TABLET(S): at 12:07

## 2021-10-09 RX ADMIN — FAMOTIDINE 20 MILLIGRAM(S): 10 INJECTION INTRAVENOUS at 06:04

## 2021-10-09 RX ADMIN — FAMOTIDINE 20 MILLIGRAM(S): 10 INJECTION INTRAVENOUS at 17:28

## 2021-10-09 RX ADMIN — Medication 975 MILLIGRAM(S): at 22:08

## 2021-10-09 RX ADMIN — Medication 975 MILLIGRAM(S): at 23:00

## 2021-10-09 RX ADMIN — Medication 50 MILLIGRAM(S): at 06:04

## 2021-10-09 RX ADMIN — Medication 975 MILLIGRAM(S): at 14:25

## 2021-10-09 RX ADMIN — Medication 1 MILLIGRAM(S): at 12:01

## 2021-10-09 RX ADMIN — Medication 50 MILLIGRAM(S): at 22:07

## 2021-10-09 RX ADMIN — Medication 975 MILLIGRAM(S): at 15:15

## 2021-10-09 NOTE — DISCHARGE NOTE PROVIDER - HOSPITAL COURSE
Admitted  Surgery L2-5 lami  Kizzy-op Antibiotics  Pain control  DVT prophylaxis  OOB/Physical Therapy Admitted- 10-  Surgery L2-5 lami  Kizzy-op Antibiotics  Pain control  DVT prophylaxis  OOB/Physical Therapy   Medicine Consult

## 2021-10-09 NOTE — DISCHARGE NOTE PROVIDER - NSDCFUADDINST_GEN_ALL_CORE_FT
No strenuous activity (bending/twisting), heavy lifting, driving or returning to work until cleared by MD. May take pepcid or prilosec for upset stomach.  May apply ice to affected areas to decrease swelling.  Call to schedule an appt with Dr. Carrillo for follow up.  Contact your doctor if you experience: fever greater than 101, redness, swelling, severe pain, drainage, chest pain, difficulty breathing, other concerns.  Follow up with your primary care provider.    ***IF PREVENA DRESSING***  May shower POD#1 and every day thereafter. Let soapy water fall over incision and pat dry. Do not need to cover. Dressing will be removed in the office.    OR     **IF EXTERNAL SUTURES OR STAPLES**  May shower daily without dressing. Do not scrub the incision and pat dry. Cover the incision after the shower with a dry gauze and paper tape or tegaderm.     OR    **IF DRAIN IN PLACE AND EXTERNAL SUTURES/STAPLES**  Dressing should be changed daily with incision and drain site covered with dry gauze and paper tape or tegaderm. You should clean the incision and drain site daily with betadine.  No strenuous activity (bending/twisting), heavy lifting, driving, exercising or returning to work until cleared by MD. May take pepcid or prilosec for upset stomach.    May apply ice to affected areas to decrease swelling.    Call to schedule an appt with Dr. Carrillo for follow up.    Contact your doctor if you experience: fever greater than 101, redness, swelling, severe pain, drainage, chest pain, difficulty breathing, other concerns.  Follow up with your primary care provider.    **IF DRAIN IN PLACE AND EXTERNAL SUTURES/STAPLES**  Dressing should be changed daily with incision and drain site covered with dry gauze and paper tape or tegaderm. You should clean the incision and drain site daily with betadine.     Followup with your primary care doctor in 1 week to monitor your liver enzymes. Avoid drinking alcohol, or taking tylenol which can elevate your liver enzymes.     Take stool softeners daily to have regular bowel movements. Drinking plenty of fluids, walking and eating a high fiber diet can all help.  No strenuous activity (bending/twisting), heavy lifting, driving, exercising or returning to work until cleared by MD. May take pepcid or prilosec for upset stomach.    May apply ice to affected areas to decrease swelling.    No strenuous activity (bending/twisting), heavy lifting, driving or returning to work until cleared by MD. May take pepcid or prilosec for upset stomach.    May apply ice to affected areas to decrease swelling.    Your dressing is water resistant, it is okay to shower.     Call to schedule an appt with Dr. Carrillo for follow up.  Contact your doctor if you experience: fever greater than 101, redness, swelling, severe pain, drainage, chest pain, difficulty breathing, other concerns.  Follow up with your primary care provider.    Call to schedule an appt with Dr. Carrillo for follow up.    Contact your doctor if you experience: fever greater than 101, redness, swelling, severe pain, drainage, chest pain, difficulty breathing, other concerns.  Follow up with your primary care provider.        Followup with your primary care doctor in 1 week to monitor your liver enzymes. Avoid drinking alcohol, or taking tylenol which can elevate your liver enzymes. You were seen by the medicine service. Medicine recommends you followup with your primary care provider for a blood test (CMP) and a RUQ ultrasound for transaminases    Take stool softeners daily to have regular bowel movements. Drinking plenty of fluids, walking and eating a high fiber diet can all help.

## 2021-10-09 NOTE — PROGRESS NOTE ADULT - SUBJECTIVE AND OBJECTIVE BOX
Ortho Note    Pt seen and examined on morning rounds. Pt comfortable without complaints, pain controlled.  Denies CP, SOB, N/V, numbness/tingling     Vital Signs Last 24 Hrs  T(C): 37.3 (10-09-21 @ 04:48), Max: 37.3 (10-09-21 @ 04:48)  T(F): 99.2 (10-09-21 @ 04:48), Max: 99.2 (10-09-21 @ 04:48)  HR: 68 (10-09-21 @ 04:48) (68 - 68)  BP: 123/68 (10-09-21 @ 04:48) (123/68 - 123/68)  BP(mean): --  RR: 18 (10-09-21 @ 04:48) (18 - 18)  SpO2: 98% (10-09-21 @ 04:48) (98% - 98%)  I&O's Summary    08 Oct 2021 07:01  -  09 Oct 2021 07:00  --------------------------------------------------------  IN: 0 mL / OUT: 1240 mL / NET: -1240 mL        Physical Exam:  General: Pt Alert and oriented, NAD  DSG C/D/I  Pulses: 2+ dp, pt pulses, wwp, cap refill <3 seconds  Sensation: SILT sural/saph/sp/dp/ tibial distributions  Motor: EHL/FHL/TA/GS  firing              A/P: 46yMale POD#1 s/p Lami L2-L5  - Stable  - Pain Control  - DVT ppx: SCDs  - Post op abx: Ancef  - PT, WBS: WBAT  - Dispo: home      Earl Moody, PGY-1  Ortho Pager [phone] Ortho Note    Pt seen and examined on morning rounds. Pt comfortable without complaints, pain controlled.  Denies CP, SOB, N/V, numbness/tingling     Vital Signs Last 24 Hrs  T(C): 37.3 (10-09-21 @ 04:48), Max: 37.3 (10-09-21 @ 04:48)  T(F): 99.2 (10-09-21 @ 04:48), Max: 99.2 (10-09-21 @ 04:48)  HR: 68 (10-09-21 @ 04:48) (68 - 68)  BP: 123/68 (10-09-21 @ 04:48) (123/68 - 123/68)  BP(mean): --  RR: 18 (10-09-21 @ 04:48) (18 - 18)  SpO2: 98% (10-09-21 @ 04:48) (98% - 98%)  I&O's Summary    08 Oct 2021 07:01  -  09 Oct 2021 07:00  --------------------------------------------------------  IN: 0 mL / OUT: 1240 mL / NET: -1240 mL        Physical Exam:  General: Pt Alert and oriented, NAD  DSG C/D/I  Pulses: 2+ dp, pt pulses, wwp, cap refill <3 seconds  Sensation: SILT sural/saph/sp/dp/ tibial distributions  Motor: EHL/FHL/TA/GS  firing              A/P: 46yMale POD#1 s/p Lami L2-L5  - Stable  - Pain Control  - keep drain  - mobilize with PT  - DVT ppx: SCDs  - Post op abx: Ancef  - PT, WBS: WBAT  - Dispo: home, likely tomorrow      Earl Moody, PGY-1  Ortho Pager [phone]

## 2021-10-09 NOTE — DISCHARGE NOTE PROVIDER - NSDCMRMEDTOKEN_GEN_ALL_CORE_FT
Edarbi 40 mg oral tablet: 1 tab(s) orally once a day  methocarbamol 500 mg oral tablet: 1 tab(s) orally every 8 hours -for muscle spasm MDD:3  oxyCODONE 5 mg oral tablet: 1-2 tab(s) orally every 4 hours, As Needed -for severe pain MDD:6  pregabalin 50 mg oral capsule: 1 cap(s) orally 3 times a day, As Needed for nerve pain MDD:3  Tylenol Extra Strength 500 mg oral tablet: 2 tab(s) orally every 8 hours, As Needed -for mild to moderate pain   Vascepa 1 g oral capsule: 2 cap(s) orally 2 times a day  Vitamin D3 25 mcg (1000 intl units) oral capsule: 1 cap(s) orally once a day   bisacodyl 10 mg rectal suppository: 1 suppository(ies) rectal once a day, As needed, Constipation  Edarbi 40 mg oral tablet: 1 tab(s) orally once a day  methocarbamol 500 mg oral tablet: 1 tab(s) orally every 8 hours -for muscle spasm MDD:3  oxyCODONE 5 mg oral tablet: 1-2 tab(s) orally every 4 hours, As Needed -for severe pain MDD:6  pregabalin 50 mg oral capsule: 1 cap(s) orally 3 times a day, As Needed for nerve pain MDD:3  Tylenol Extra Strength 500 mg oral tablet: 2 tab(s) orally every 8 hours, As Needed -for mild to moderate pain   Vascepa 1 g oral capsule: 2 cap(s) orally 2 times a day  Vitamin D3 25 mcg (1000 intl units) oral capsule: 1 cap(s) orally once a day   bisacodyl 10 mg rectal suppository: 1 suppository(ies) rectal once a day, As needed, Constipation  Edarbi 40 mg oral tablet: 1 tab(s) orally once a day  methocarbamol 500 mg oral tablet: 1 tab(s) orally every 8 hours -for muscle spasm MDD:3  oxyCODONE 5 mg oral tablet: 1-2 tab(s) orally every 4 hours, As Needed -for severe pain MDD:6  pregabalin 50 mg oral capsule: 1 cap(s) orally 3 times a day, As Needed for nerve pain MDD:3  Vascepa 1 g oral capsule: 2 cap(s) orally 2 times a day  Vitamin D3 25 mcg (1000 intl units) oral capsule: 1 cap(s) orally once a day

## 2021-10-09 NOTE — DISCHARGE NOTE PROVIDER - CARE PROVIDER_API CALL
Tereso Carrillo)  Orthopedics  [address] [address]  Phone: [phone]  Fax: [phone]  Follow Up Time: 2 weeks

## 2021-10-09 NOTE — DISCHARGE NOTE PROVIDER - NSDCCPCAREPLAN_GEN_ALL_CORE_FT
PRINCIPAL DISCHARGE DIAGNOSIS  Diagnosis: Lumbar spinal stenosis  Assessment and Plan of Treatment: improvment s/p Lami L2-5       PRINCIPAL DISCHARGE DIAGNOSIS  Diagnosis: Lumbar spinal stenosis  Assessment and Plan of Treatment: improvement s/p Lami L2-5

## 2021-10-10 ENCOUNTER — TRANSCRIPTION ENCOUNTER (OUTPATIENT)
Age: 46
End: 2021-10-10

## 2021-10-10 DIAGNOSIS — E66.01 MORBID (SEVERE) OBESITY DUE TO EXCESS CALORIES: ICD-10-CM

## 2021-10-10 DIAGNOSIS — D62 ACUTE POSTHEMORRHAGIC ANEMIA: ICD-10-CM

## 2021-10-10 DIAGNOSIS — E66.09 OTHER OBESITY DUE TO EXCESS CALORIES: ICD-10-CM

## 2021-10-10 DIAGNOSIS — R55 SYNCOPE AND COLLAPSE: ICD-10-CM

## 2021-10-10 DIAGNOSIS — R79.89 OTHER SPECIFIED ABNORMAL FINDINGS OF BLOOD CHEMISTRY: ICD-10-CM

## 2021-10-10 DIAGNOSIS — Z98.890 OTHER SPECIFIED POSTPROCEDURAL STATES: ICD-10-CM

## 2021-10-10 DIAGNOSIS — E78.5 HYPERLIPIDEMIA, UNSPECIFIED: ICD-10-CM

## 2021-10-10 DIAGNOSIS — I10 ESSENTIAL (PRIMARY) HYPERTENSION: ICD-10-CM

## 2021-10-10 LAB
ALBUMIN SERPL ELPH-MCNC: 4.1 G/DL — SIGNIFICANT CHANGE UP (ref 3.3–5)
ALP SERPL-CCNC: 88 U/L — SIGNIFICANT CHANGE UP (ref 40–120)
ALT FLD-CCNC: 103 U/L — HIGH (ref 10–45)
ANION GAP SERPL CALC-SCNC: 10 MMOL/L — SIGNIFICANT CHANGE UP (ref 5–17)
ANION GAP SERPL CALC-SCNC: 11 MMOL/L — SIGNIFICANT CHANGE UP (ref 5–17)
APTT BLD: 26.3 SEC — LOW (ref 27.5–35.5)
AST SERPL-CCNC: 73 U/L — HIGH (ref 10–40)
BASOPHILS # BLD AUTO: 0.06 K/UL — SIGNIFICANT CHANGE UP (ref 0–0.2)
BASOPHILS NFR BLD AUTO: 0.5 % — SIGNIFICANT CHANGE UP (ref 0–2)
BILIRUB SERPL-MCNC: 0.3 MG/DL — SIGNIFICANT CHANGE UP (ref 0.2–1.2)
BUN SERPL-MCNC: 12 MG/DL — SIGNIFICANT CHANGE UP (ref 7–23)
BUN SERPL-MCNC: 14 MG/DL — SIGNIFICANT CHANGE UP (ref 7–23)
CALCIUM SERPL-MCNC: 9.2 MG/DL — SIGNIFICANT CHANGE UP (ref 8.4–10.5)
CALCIUM SERPL-MCNC: 9.6 MG/DL — SIGNIFICANT CHANGE UP (ref 8.4–10.5)
CHLORIDE SERPL-SCNC: 104 MMOL/L — SIGNIFICANT CHANGE UP (ref 96–108)
CHLORIDE SERPL-SCNC: 104 MMOL/L — SIGNIFICANT CHANGE UP (ref 96–108)
CO2 SERPL-SCNC: 24 MMOL/L — SIGNIFICANT CHANGE UP (ref 22–31)
CO2 SERPL-SCNC: 27 MMOL/L — SIGNIFICANT CHANGE UP (ref 22–31)
CREAT SERPL-MCNC: 0.76 MG/DL — SIGNIFICANT CHANGE UP (ref 0.5–1.3)
CREAT SERPL-MCNC: 0.87 MG/DL — SIGNIFICANT CHANGE UP (ref 0.5–1.3)
EOSINOPHIL # BLD AUTO: 0.18 K/UL — SIGNIFICANT CHANGE UP (ref 0–0.5)
EOSINOPHIL NFR BLD AUTO: 1.5 % — SIGNIFICANT CHANGE UP (ref 0–6)
GLUCOSE SERPL-MCNC: 104 MG/DL — HIGH (ref 70–99)
GLUCOSE SERPL-MCNC: 144 MG/DL — HIGH (ref 70–99)
HCT VFR BLD CALC: 36.4 % — LOW (ref 39–50)
HCT VFR BLD CALC: 36.6 % — LOW (ref 39–50)
HGB BLD-MCNC: 11.7 G/DL — LOW (ref 13–17)
HGB BLD-MCNC: 12 G/DL — LOW (ref 13–17)
IMM GRANULOCYTES NFR BLD AUTO: 0.4 % — SIGNIFICANT CHANGE UP (ref 0–1.5)
INR BLD: 1.01 — SIGNIFICANT CHANGE UP (ref 0.88–1.16)
LACTATE SERPL-SCNC: 1.5 MMOL/L — SIGNIFICANT CHANGE UP (ref 0.5–2)
LYMPHOCYTES # BLD AUTO: 26.6 % — SIGNIFICANT CHANGE UP (ref 13–44)
LYMPHOCYTES # BLD AUTO: 3.24 K/UL — SIGNIFICANT CHANGE UP (ref 1–3.3)
MAGNESIUM SERPL-MCNC: 2.2 MG/DL — SIGNIFICANT CHANGE UP (ref 1.6–2.6)
MCHC RBC-ENTMCNC: 28.8 PG — SIGNIFICANT CHANGE UP (ref 27–34)
MCHC RBC-ENTMCNC: 29.4 PG — SIGNIFICANT CHANGE UP (ref 27–34)
MCHC RBC-ENTMCNC: 32 GM/DL — SIGNIFICANT CHANGE UP (ref 32–36)
MCHC RBC-ENTMCNC: 33 GM/DL — SIGNIFICANT CHANGE UP (ref 32–36)
MCV RBC AUTO: 89.2 FL — SIGNIFICANT CHANGE UP (ref 80–100)
MCV RBC AUTO: 90.1 FL — SIGNIFICANT CHANGE UP (ref 80–100)
MONOCYTES # BLD AUTO: 1.09 K/UL — HIGH (ref 0–0.9)
MONOCYTES NFR BLD AUTO: 9 % — SIGNIFICANT CHANGE UP (ref 2–14)
NEUTROPHILS # BLD AUTO: 7.54 K/UL — HIGH (ref 1.8–7.4)
NEUTROPHILS NFR BLD AUTO: 62 % — SIGNIFICANT CHANGE UP (ref 43–77)
NRBC # BLD: 0 /100 WBCS — SIGNIFICANT CHANGE UP (ref 0–0)
NRBC # BLD: 0 /100 WBCS — SIGNIFICANT CHANGE UP (ref 0–0)
PHOSPHATE SERPL-MCNC: 4.3 MG/DL — SIGNIFICANT CHANGE UP (ref 2.5–4.5)
PLATELET # BLD AUTO: 201 K/UL — SIGNIFICANT CHANGE UP (ref 150–400)
PLATELET # BLD AUTO: 232 K/UL — SIGNIFICANT CHANGE UP (ref 150–400)
POTASSIUM SERPL-MCNC: 3.6 MMOL/L — SIGNIFICANT CHANGE UP (ref 3.5–5.3)
POTASSIUM SERPL-MCNC: 3.6 MMOL/L — SIGNIFICANT CHANGE UP (ref 3.5–5.3)
POTASSIUM SERPL-SCNC: 3.6 MMOL/L — SIGNIFICANT CHANGE UP (ref 3.5–5.3)
POTASSIUM SERPL-SCNC: 3.6 MMOL/L — SIGNIFICANT CHANGE UP (ref 3.5–5.3)
PROT SERPL-MCNC: 7.3 G/DL — SIGNIFICANT CHANGE UP (ref 6–8.3)
PROTHROM AB SERPL-ACNC: 12.1 SEC — SIGNIFICANT CHANGE UP (ref 10.6–13.6)
RBC # BLD: 4.06 M/UL — LOW (ref 4.2–5.8)
RBC # BLD: 4.08 M/UL — LOW (ref 4.2–5.8)
RBC # FLD: 12.3 % — SIGNIFICANT CHANGE UP (ref 10.3–14.5)
RBC # FLD: 12.3 % — SIGNIFICANT CHANGE UP (ref 10.3–14.5)
SODIUM SERPL-SCNC: 139 MMOL/L — SIGNIFICANT CHANGE UP (ref 135–145)
SODIUM SERPL-SCNC: 141 MMOL/L — SIGNIFICANT CHANGE UP (ref 135–145)
WBC # BLD: 12.16 K/UL — HIGH (ref 3.8–10.5)
WBC # BLD: 7.93 K/UL — SIGNIFICANT CHANGE UP (ref 3.8–10.5)
WBC # FLD AUTO: 12.16 K/UL — HIGH (ref 3.8–10.5)
WBC # FLD AUTO: 7.93 K/UL — SIGNIFICANT CHANGE UP (ref 3.8–10.5)

## 2021-10-10 PROCEDURE — 36000 PLACE NEEDLE IN VEIN: CPT

## 2021-10-10 PROCEDURE — 99233 SBSQ HOSP IP/OBS HIGH 50: CPT

## 2021-10-10 PROCEDURE — 99223 1ST HOSP IP/OBS HIGH 75: CPT

## 2021-10-10 RX ORDER — TRAMADOL HYDROCHLORIDE 50 MG/1
50 TABLET ORAL EVERY 4 HOURS
Refills: 0 | Status: DISCONTINUED | OUTPATIENT
Start: 2021-10-10 | End: 2021-10-12

## 2021-10-10 RX ORDER — SODIUM CHLORIDE 9 MG/ML
1000 INJECTION INTRAMUSCULAR; INTRAVENOUS; SUBCUTANEOUS ONCE
Refills: 0 | Status: COMPLETED | OUTPATIENT
Start: 2021-10-10 | End: 2021-10-10

## 2021-10-10 RX ORDER — TRAMADOL HYDROCHLORIDE 50 MG/1
25 TABLET ORAL EVERY 4 HOURS
Refills: 0 | Status: DISCONTINUED | OUTPATIENT
Start: 2021-10-10 | End: 2021-10-12

## 2021-10-10 RX ADMIN — Medication 50 MILLIGRAM(S): at 05:31

## 2021-10-10 RX ADMIN — FAMOTIDINE 20 MILLIGRAM(S): 10 INJECTION INTRAVENOUS at 18:01

## 2021-10-10 RX ADMIN — OXYCODONE HYDROCHLORIDE 5 MILLIGRAM(S): 5 TABLET ORAL at 11:35

## 2021-10-10 RX ADMIN — Medication 975 MILLIGRAM(S): at 21:29

## 2021-10-10 RX ADMIN — Medication 50 MILLIGRAM(S): at 21:29

## 2021-10-10 RX ADMIN — Medication 1 MILLIGRAM(S): at 11:35

## 2021-10-10 RX ADMIN — Medication 975 MILLIGRAM(S): at 05:30

## 2021-10-10 RX ADMIN — Medication 975 MILLIGRAM(S): at 06:30

## 2021-10-10 RX ADMIN — Medication 975 MILLIGRAM(S): at 22:29

## 2021-10-10 RX ADMIN — METHOCARBAMOL 500 MILLIGRAM(S): 500 TABLET, FILM COATED ORAL at 13:25

## 2021-10-10 RX ADMIN — METHOCARBAMOL 500 MILLIGRAM(S): 500 TABLET, FILM COATED ORAL at 05:31

## 2021-10-10 RX ADMIN — Medication 1 TABLET(S): at 11:34

## 2021-10-10 RX ADMIN — OXYCODONE HYDROCHLORIDE 5 MILLIGRAM(S): 5 TABLET ORAL at 12:35

## 2021-10-10 RX ADMIN — SENNA PLUS 2 TABLET(S): 8.6 TABLET ORAL at 13:48

## 2021-10-10 RX ADMIN — Medication 975 MILLIGRAM(S): at 13:25

## 2021-10-10 RX ADMIN — Medication 50 MILLIGRAM(S): at 13:25

## 2021-10-10 RX ADMIN — METHOCARBAMOL 500 MILLIGRAM(S): 500 TABLET, FILM COATED ORAL at 21:29

## 2021-10-10 RX ADMIN — SODIUM CHLORIDE 1000 MILLILITER(S): 9 INJECTION INTRAMUSCULAR; INTRAVENOUS; SUBCUTANEOUS at 15:04

## 2021-10-10 RX ADMIN — Medication 975 MILLIGRAM(S): at 14:25

## 2021-10-10 RX ADMIN — FAMOTIDINE 20 MILLIGRAM(S): 10 INJECTION INTRAVENOUS at 05:30

## 2021-10-10 NOTE — CONSULT NOTE ADULT - PROBLEM SELECTOR RECOMMENDATION 3
patient instructed on ICS use  patient needs aggressive bowel regimen to facilitate BM given opiates: would add miralax + senna and consider a dulcolax suppository  VTE prophylaxis with ASA

## 2021-10-10 NOTE — PROGRESS NOTE ADULT - SUBJECTIVE AND OBJECTIVE BOX
Ortho Note    Pt seen and examined on morning rounds. Pt comfortable without complaints, pain controlled. Walked with PT down the hallway and stairs with no issues.  Denies CP, SOB, N/V, numbness/tingling     Vital Signs Last 24 Hrs  T(C): 37.1 (10-10-21 @ 08:25), Max: 37.1 (10-10-21 @ 08:25)  T(F): 98.8 (10-10-21 @ 08:25), Max: 98.8 (10-10-21 @ 08:25)  HR: 80 (10-10-21 @ 08:25) (72 - 80)  BP: 128/74 (10-10-21 @ 08:25) (111/70 - 128/74)  BP(mean): 92 (10-10-21 @ 08:25) (92 - 92)  RR: 18 (10-10-21 @ 08:25) (18 - 18)  SpO2: 97% (10-10-21 @ 08:25) (97% - 98%)  I&O's Summary    09 Oct 2021 07:01  -  10 Oct 2021 07:00  --------------------------------------------------------  IN: 0 mL / OUT: 1700 mL / NET: -1700 mL        Physical Exam:  General: Pt Alert and oriented, NAD  DSG C/D/I  Pulses: 2+ dp, pt pulses, wwp, cap refill <3 seconds  Sensation: SILT sural/saph/sp/dp/ tibial distributions  Motor: EHL/FHL/TA/GS  firing                          11.7   7.93  )-----------( 201      ( 10 Oct 2021 08:28 )             36.6     10-10    141  |  104  |  12  ----------------------------<  104<H>  3.6   |  27  |  0.87    Ca    9.2      10 Oct 2021 08:28        A/P: 46yMale POD#2 s/p Lami L2-L5  - Stable  - Pain Control  - drain removed  - mobilize with PT  - DVT ppx: SCDs  - Post op abx: Ancef  - PT, WBS: WBAT  - Dispo: home today    Earl Moody, PGY-1  Ortho Pager [phone]

## 2021-10-10 NOTE — CONSULT NOTE ADULT - ASSESSMENT
47 y/o male with lumbar spinal stenosis presenting for elective laminectomy now POD 2, consulted for syncopal event.

## 2021-10-10 NOTE — CHART NOTE - NSCHARTNOTEFT_GEN_A_CORE
***Rapid Response Clinical Impact Nurse Practitioner Note***    Patient is a 46y old  Male with HTN, HLD, Spinal Stenosis, chronic back pain now POD# 2 with laminectomy L2-L5 was in no acute distress being ready for discharge today when patient was having a bowel movement, noted pain, and had a vasovagal episode so a RRT was called. On arrival patient found to be in care of primary nursing team. Patient pallorous, bradycardic, and hypotensive. Patient diaphoretic. Patient found to be in Trendelenburg position.     BG WNL, bradycardic to the 50's, SBP 80's. Diaphoretic and warm to touch. No urinary or bowel incontinence.     Review of systems: Patient denies any complaints. States feels the need to defecate.     Allergies    No Known Allergies    Intolerances    PAST MEDICAL & SURGICAL HISTORY:  Spinal stenosis    HTN (hypertension)    HLD (hyperlipidemia)    H/O low back pain    History of appendectomy    Vital Signs Last 24 Hrs  T(C): 36.8 (10 Oct 2021 13:50), Max: 37.4 (09 Oct 2021 20:31)  T(F): 98.2 (10 Oct 2021 13:50), Max: 99.3 (09 Oct 2021 20:31)  HR: 64 (10 Oct 2021 14:15) (56 - 80)  BP: 126/84 (10 Oct 2021 14:15) (88/60 - 129/75)  BP(mean): 98 (10 Oct 2021 14:15) (92 - 98)  RR: 20 (10 Oct 2021 13:50) (15 - 20)  SpO2: 94% (10 Oct 2021 14:15) (88% - 98%)  Physical Exam:   General: Adult male lying in bed, diaphoretic.   HEENT: +PERRLA, EOMI, Subconjunctival hemorrhage in right eye. Conjunctiva pink and moist. MMM, Trachea midline. No JVD.  Pulm: Speaking full sentences. LS CTA bilaterally.   Cardiac: S1, S2, PMI WNL.  GI: Abdomen SNT to palpation. +Bowel sounds.  : No incontinence. Self voids.  MSK: ESTRADA X 4. +CMS X 4.  Skin: Pale, cool, and diaphoretic. Color returned and less diaphoretic post event.  Neuro: Awake, alert, and oriented X 4.    10-09 @ 07:01  -  10-10 @ 07:00  --------------------------------------------------------  IN: 0 mL / OUT: 1700 mL / NET: -1700 mL                           11.7   7.93  )-----------( 201      ( 10 Oct 2021 08:28 )             36.6     10-10    141  |  104  |  12  ----------------------------<  104<H>  3.6   |  27  |  0.87    Ca    9.2      10 Oct 2021 08:28        MEDICATIONS  (STANDING):  acetaminophen   Tablet .. 975 milliGRAM(s) Oral every 8 hours  famotidine    Tablet 20 milliGRAM(s) Oral every 12 hours  folic acid 1 milliGRAM(s) Oral daily  lactated ringers. 1000 milliLiter(s) (100 mL/Hr) IV Continuous <Continuous>  methocarbamol 500 milliGRAM(s) Oral every 8 hours  multivitamin 1 Tablet(s) Oral daily  pregabalin 50 milliGRAM(s) Oral three times a day  senna 2 Tablet(s) Oral at bedtime  Streptomycin 1Gm/NS 0.9% 1 L 1 Application(s) 1 Application(s) Topical every 1 hour    MEDICATIONS  (PRN):  HYDROmorphone  Injectable 0.5 milliGRAM(s) IV Push every 15 minutes PRN pacu  HYDROmorphone  Injectable 0.5 milliGRAM(s) IV Push every 4 hours PRN breakthrough  magnesium hydroxide Suspension 30 milliLiter(s) Oral daily PRN Constipation  ondansetron Injectable 4 milliGRAM(s) IV Push every 6 hours PRN Nausea and/or Vomiting  traMADol 25 milliGRAM(s) Oral every 4 hours PRN Moderate Pain (4 - 6)  traMADol 50 milliGRAM(s) Oral every 4 hours PRN Severe Pain (7 - 10)    POCUS: Pulm: A-Line pattern bilaterally. No B-Lines. No effusions. Cardiac: LV Concentric. No RV dilation. No Archer's. DVT: A bedside ultrasound was conducted to assess for DVT. Bilateral lower extremities evaluated at 3 points– common femoral vein, saphenofemoral junction, and the popliteal vein. Sequential compressions at these sites showed fully compressible veins. Bladder: ~110 ml in bladder.    Assessment- Rapid Response called for 46y year old Male with a past medical history of HTN, HLD, Spinal Stenosis, chronic back pain now POD# 2 with laminectomy L2-L5 now status post RRT for syncope likely in setting of vasovagal event.     Plan-  -EKG STAT with NSR on EKG no STEMI, No S1Q3T3.  -BG WNL.  -Maintain 2 large bore IV's.   -VS per floor protocol.   -Follow up CBC, CMP, Coags, Lactate, Troponin.  -No signs of sepsis.   -Given 1 liter NS Bolus during RRT.  -Strict I and O's.   -Activity Ad Danisha with close monitoring.  -Hemodynamically stable, less likely cardiac event, patient ok to stay on floor.   -Rest of plan of care per primary team.     Above discussed with primary nursing team, primary ortho team, and PCCM Fellow. ***Rapid Response Clinical Impact Nurse Practitioner Note***    Patient is a 46y old  Male with HTN, HLD, Spinal Stenosis, chronic back pain now POD# 2 with laminectomy L2-L5 was in no acute distress being ready for discharge today when patient was having a bowel movement, noted pain, and had a vasovagal episode so a RRT was called. On arrival patient found to be in care of primary nursing team. Patient pallorous, bradycardic, and hypotensive. Patient diaphoretic. Patient found to be in Trendelenburg position.     BG WNL, bradycardic to the 50's, SBP 80's. Diaphoretic and warm to touch. No urinary or bowel incontinence.     Review of systems: Patient denies any complaints. States feels the need to defecate.     Allergies    No Known Allergies    Intolerances    PAST MEDICAL & SURGICAL HISTORY:  Spinal stenosis    HTN (hypertension)    HLD (hyperlipidemia)    H/O low back pain    History of appendectomy    Vital Signs Last 24 Hrs  T(C): 36.8 (10 Oct 2021 13:50), Max: 37.4 (09 Oct 2021 20:31)  T(F): 98.2 (10 Oct 2021 13:50), Max: 99.3 (09 Oct 2021 20:31)  HR: 64 (10 Oct 2021 14:15) (56 - 80)  BP: 126/84 (10 Oct 2021 14:15) (88/60 - 129/75)  BP(mean): 98 (10 Oct 2021 14:15) (92 - 98)  RR: 20 (10 Oct 2021 13:50) (15 - 20)  SpO2: 94% (10 Oct 2021 14:15) (88% - 98%)  Physical Exam:   General: Adult male lying in bed, diaphoretic.   HEENT: +PERRLA, EOMI, Subconjunctival hemorrhage in right eye. Conjunctiva pink and moist. MMM, Trachea midline. No JVD.  Pulm: Speaking full sentences. LS CTA bilaterally.   Cardiac: S1, S2, PMI WNL.  GI: Abdomen SNT to palpation. +Bowel sounds.  : No incontinence. Self voids.  MSK: ESTRADA X 4. +CMS X 4. Lumbar area incision open to air feels non-tender without fluctuance. No drainage.   Skin: Pale, cool, and diaphoretic. Color returned and less diaphoretic post event.  Neuro: Awake, alert, and oriented X 4.    10-09 @ 07:01  -  10-10 @ 07:00  --------------------------------------------------------  IN: 0 mL / OUT: 1700 mL / NET: -1700 mL                         11.7   7.93  )-----------( 201      ( 10 Oct 2021 08:28 )             36.6     10-10    141  |  104  |  12  ----------------------------<  104<H>  3.6   |  27  |  0.87    Ca    9.2      10 Oct 2021 08:28        MEDICATIONS  (STANDING):  acetaminophen   Tablet .. 975 milliGRAM(s) Oral every 8 hours  famotidine    Tablet 20 milliGRAM(s) Oral every 12 hours  folic acid 1 milliGRAM(s) Oral daily  lactated ringers. 1000 milliLiter(s) (100 mL/Hr) IV Continuous <Continuous>  methocarbamol 500 milliGRAM(s) Oral every 8 hours  multivitamin 1 Tablet(s) Oral daily  pregabalin 50 milliGRAM(s) Oral three times a day  senna 2 Tablet(s) Oral at bedtime  Streptomycin 1Gm/NS 0.9% 1 L 1 Application(s) 1 Application(s) Topical every 1 hour    MEDICATIONS  (PRN):  HYDROmorphone  Injectable 0.5 milliGRAM(s) IV Push every 15 minutes PRN pacu  HYDROmorphone  Injectable 0.5 milliGRAM(s) IV Push every 4 hours PRN breakthrough  magnesium hydroxide Suspension 30 milliLiter(s) Oral daily PRN Constipation  ondansetron Injectable 4 milliGRAM(s) IV Push every 6 hours PRN Nausea and/or Vomiting  traMADol 25 milliGRAM(s) Oral every 4 hours PRN Moderate Pain (4 - 6)  traMADol 50 milliGRAM(s) Oral every 4 hours PRN Severe Pain (7 - 10)    POCUS: Pulm: A-Line pattern bilaterally. No B-Lines. No effusions. Cardiac: LV Concentric. No RV dilation. No Archer's. DVT: A bedside ultrasound was conducted to assess for DVT. Bilateral lower extremities evaluated at 3 points– common femoral vein, saphenofemoral junction, and the popliteal vein. Sequential compressions at these sites showed fully compressible veins. Bladder: ~110 ml in bladder.    Assessment- Rapid Response called for 46y year old Male with a past medical history of HTN, HLD, Spinal Stenosis, chronic back pain now POD# 2 with laminectomy L2-L5 now status post RRT for syncope likely in setting of vasovagal event.     Plan-  -EKG STAT with NSR on EKG no STEMI, No S1Q3T3.  -BG WNL.  -Maintain 2 large bore IV's.   -VS per floor protocol.   -Follow up CBC, CMP, Coags, Lactate, Troponin.  -No signs of sepsis.   -Given 1 liter NS Bolus during RRT.  -Strict I and O's.   -Activity Ad Danisha with close monitoring.  -Hemodynamically stable, less likely cardiac event, patient ok to stay on floor.   -Rest of plan of care per primary team.     Above discussed with primary nursing team, primary ortho team, and PCCM Fellow. ***Rapid Response Clinical Impact Nurse Practitioner Note***    Patient is a 46y old  Male with HTN, HLD, Spinal Stenosis, chronic back pain now POD# 2 with laminectomy L2-L5 was in no acute distress being ready for discharge today when patient was having a bowel movement, noted pain, and had a vasovagal episode so a RRT was called. On arrival patient found to be in care of primary nursing team. Patient pallorous, bradycardic, and hypotensive. Patient diaphoretic. Patient found to be in Trendelenburg position.     BG WNL, bradycardic to the 50's, SBP 80's. Diaphoretic and warm to touch. No urinary or bowel incontinence.     Review of systems: Patient denies any complaints. States feels the need to defecate.     Allergies    No Known Allergies    Intolerances    PAST MEDICAL & SURGICAL HISTORY:  Spinal stenosis    HTN (hypertension)    HLD (hyperlipidemia)    H/O low back pain    History of appendectomy    Vital Signs Last 24 Hrs  T(C): 36.8 (10 Oct 2021 13:50), Max: 37.4 (09 Oct 2021 20:31)  T(F): 98.2 (10 Oct 2021 13:50), Max: 99.3 (09 Oct 2021 20:31)  HR: 64 (10 Oct 2021 14:15) (56 - 80)  BP: 126/84 (10 Oct 2021 14:15) (88/60 - 129/75)  BP(mean): 98 (10 Oct 2021 14:15) (92 - 98)  RR: 20 (10 Oct 2021 13:50) (15 - 20)  SpO2: 94% (10 Oct 2021 14:15) (88% - 98%)  Physical Exam:   General: Adult male lying in bed, diaphoretic.   HEENT: +PERRLA, EOMI, Subconjunctival hemorrhage in right eye. Conjunctiva pink and moist. MMM, Trachea midline. No JVD.  Pulm: Speaking full sentences. LS CTA bilaterally.   Cardiac: S1, S2, PMI WNL.  GI: Abdomen SNT to palpation. +Bowel sounds.  : No incontinence. Self voids.  MSK: ESTRADA X 4. +CMS X 4. Lumbar area incision open to air feels non-tender without fluctuance. No drainage.   Skin: Pale, cool, and diaphoretic. Color returned and less diaphoretic post event.  Neuro: Awake, alert, and oriented X 4.    10-09 @ 07:01  -  10-10 @ 07:00  --------------------------------------------------------  IN: 0 mL / OUT: 1700 mL / NET: -1700 mL                         11.7   7.93  )-----------( 201      ( 10 Oct 2021 08:28 )             36.6     10-10    141  |  104  |  12  ----------------------------<  104<H>  3.6   |  27  |  0.87    Ca    9.2      10 Oct 2021 08:28        MEDICATIONS  (STANDING):  acetaminophen   Tablet .. 975 milliGRAM(s) Oral every 8 hours  famotidine    Tablet 20 milliGRAM(s) Oral every 12 hours  folic acid 1 milliGRAM(s) Oral daily  lactated ringers. 1000 milliLiter(s) (100 mL/Hr) IV Continuous <Continuous>  methocarbamol 500 milliGRAM(s) Oral every 8 hours  multivitamin 1 Tablet(s) Oral daily  pregabalin 50 milliGRAM(s) Oral three times a day  senna 2 Tablet(s) Oral at bedtime  Streptomycin 1Gm/NS 0.9% 1 L 1 Application(s) 1 Application(s) Topical every 1 hour    MEDICATIONS  (PRN):  HYDROmorphone  Injectable 0.5 milliGRAM(s) IV Push every 15 minutes PRN pacu  HYDROmorphone  Injectable 0.5 milliGRAM(s) IV Push every 4 hours PRN breakthrough  magnesium hydroxide Suspension 30 milliLiter(s) Oral daily PRN Constipation  ondansetron Injectable 4 milliGRAM(s) IV Push every 6 hours PRN Nausea and/or Vomiting  traMADol 25 milliGRAM(s) Oral every 4 hours PRN Moderate Pain (4 - 6)  traMADol 50 milliGRAM(s) Oral every 4 hours PRN Severe Pain (7 - 10)    POCUS: Pulm: A-Line pattern bilaterally. No B-Lines. No effusions. Cardiac: LV Concentric. No RV dilation. No Archer's. DVT: A bedside ultrasound was conducted to assess for DVT. Bilateral lower extremities evaluated at 3 points– common femoral vein, saphenofemoral junction, and the popliteal vein. Sequential compressions at these sites showed fully compressible veins. Bladder: ~110 ml in bladder.    Assessment- Rapid Response called for 46y year old Male with a past medical history of HTN, HLD, Spinal Stenosis, chronic back pain now POD# 2 with laminectomy L2-L5 now status post RRT for syncope likely in setting of vasovagal event.     Plan-  -EKG STAT with NSR on EKG no STEMI, No S1Q3T3.  -BG WNL.  -Maintain 2 large bore IV's.   -VS per floor protocol.   -Follow up CBC, CMP, Coags, Lactate, Troponin.  -No signs of sepsis.   -Given 1 liter NS Bolus during RRT.  -Strict I and O's.   -Activity Ad Danisha with close monitoring.  -Fall Precautions.   -Hemodynamically stable, less likely cardiac event, patient ok to stay on floor.   -Rest of plan of care per primary team.     Above discussed with primary nursing team, primary ortho team, and PCCM Fellow. ***Rapid Response Clinical Impact Nurse Practitioner Note***    Patient is a 46y old  Male with HTN, HLD, Spinal Stenosis, chronic back pain now POD# 2 with laminectomy L2-L5 was in no acute distress being ready for discharge today when patient was having a bowel movement, noted pain, and had a vasovagal episode so a RRT was called. On arrival patient found to be in care of primary nursing team. Patient pallorous, bradycardic, and hypotensive. Patient diaphoretic. Patient found to be in Trendelenburg position.     BG WNL, bradycardic to the 50's, SBP 80's. Diaphoretic and warm to touch. No urinary or bowel incontinence.     Review of systems: Patient denies any complaints. States feels the need to defecate.     Allergies    No Known Allergies    Intolerances    PAST MEDICAL & SURGICAL HISTORY:  Spinal stenosis    HTN (hypertension)    HLD (hyperlipidemia)    H/O low back pain    History of appendectomy    Vital Signs Last 24 Hrs  T(C): 36.8 (10 Oct 2021 13:50), Max: 37.4 (09 Oct 2021 20:31)  T(F): 98.2 (10 Oct 2021 13:50), Max: 99.3 (09 Oct 2021 20:31)  HR: 64 (10 Oct 2021 14:15) (56 - 80)  BP: 126/84 (10 Oct 2021 14:15) (88/60 - 129/75)  BP(mean): 98 (10 Oct 2021 14:15) (92 - 98)  RR: 20 (10 Oct 2021 13:50) (15 - 20)  SpO2: 94% (10 Oct 2021 14:15) (88% - 98%)  Physical Exam:   General: Adult male lying in bed, diaphoretic.   HEENT: +PERRLA, EOMI, Subconjunctival hemorrhage in right eye. Conjunctiva pink and moist. MMM, Trachea midline. No JVD.  Pulm: Speaking full sentences. LS CTA bilaterally.   Cardiac: S1, S2, PMI WNL.  GI: Abdomen SNT to palpation. +Bowel sounds.  : No incontinence. Self voids.  MSK: ESTRADA X 4. +CMS X 4. Lumbar area incision open to air feels non-tender without fluctuance. No drainage.   Skin: Pale, cool, and diaphoretic. Color returned and less diaphoretic post event.  Neuro: Awake, alert, and oriented X 4.    10-09 @ 07:01  -  10-10 @ 07:00  --------------------------------------------------------  IN: 0 mL / OUT: 1700 mL / NET: -1700 mL                         11.7   7.93  )-----------( 201      ( 10 Oct 2021 08:28 )             36.6     10-10    141  |  104  |  12  ----------------------------<  104<H>  3.6   |  27  |  0.87    Ca    9.2      10 Oct 2021 08:28        MEDICATIONS  (STANDING):  acetaminophen   Tablet .. 975 milliGRAM(s) Oral every 8 hours  famotidine    Tablet 20 milliGRAM(s) Oral every 12 hours  folic acid 1 milliGRAM(s) Oral daily  lactated ringers. 1000 milliLiter(s) (100 mL/Hr) IV Continuous <Continuous>  methocarbamol 500 milliGRAM(s) Oral every 8 hours  multivitamin 1 Tablet(s) Oral daily  pregabalin 50 milliGRAM(s) Oral three times a day  senna 2 Tablet(s) Oral at bedtime  Streptomycin 1Gm/NS 0.9% 1 L 1 Application(s) 1 Application(s) Topical every 1 hour    MEDICATIONS  (PRN):  HYDROmorphone  Injectable 0.5 milliGRAM(s) IV Push every 15 minutes PRN pacu  HYDROmorphone  Injectable 0.5 milliGRAM(s) IV Push every 4 hours PRN breakthrough  magnesium hydroxide Suspension 30 milliLiter(s) Oral daily PRN Constipation  ondansetron Injectable 4 milliGRAM(s) IV Push every 6 hours PRN Nausea and/or Vomiting  traMADol 25 milliGRAM(s) Oral every 4 hours PRN Moderate Pain (4 - 6)  traMADol 50 milliGRAM(s) Oral every 4 hours PRN Severe Pain (7 - 10)    POCUS: Pulm: A-Line pattern bilaterally. No B-Lines. No effusions. Cardiac: LV Concentric. No RV dilation. No Archer's. DVT: A bedside ultrasound was conducted to assess for DVT. Bilateral lower extremities evaluated at 3 points– common femoral vein, saphenofemoral junction, and the popliteal vein. Sequential compressions at these sites showed fully compressible veins. Bladder: ~110 ml in bladder.    Assessment- Rapid Response called for 46y year old Male with a past medical history of HTN, HLD, Spinal Stenosis, chronic back pain now POD# 2 with laminectomy L2-L5 now status post RRT for syncope likely in setting of vasovagal event.     Plan-  -EKG STAT with NSR on EKG no STEMI, No S1Q3T3.  -BG WNL.  -Maintain 2 large bore IV's.   -VS per floor protocol.   -Follow up CBC, CMP, Coags, Lactate, Troponin.  -No signs of sepsis.   -Given 1 liter NS Bolus during RRT.  -Strict I and O's.   -Activity Ad Danisha with close monitoring.  -Fall Precautions.   -Incentive Spirometry.   -Hemodynamically stable, less likely cardiac event, patient ok to stay on floor.   -Rest of plan of care per primary team.     Above discussed with primary nursing team, primary ortho team, and PCCM Fellow. ***Rapid Response Clinical Impact Nurse Practitioner Note***    Patient is a 46y old  Male with HTN, HLD, Spinal Stenosis, chronic back pain now POD# 2 with laminectomy L2-L5 was in no acute distress being ready for discharge today when patient was having a bowel movement, noted pain, and had a vasovagal episode so a RRT was called. On arrival patient found to be in care of primary nursing team. Patient pallorous, bradycardic, and hypotensive. Patient diaphoretic. Patient found to be in Trendelenburg position.     BG WNL, bradycardic to the 50's, SBP 80's. Diaphoretic and warm to touch. No urinary or bowel incontinence.     Review of systems: Patient denies any complaints. States feels the need to defecate.     Allergies     No Known Allergies    Intolerances    PAST MEDICAL & SURGICAL HISTORY:  Spinal stenosis    HTN (hypertension)    HLD (hyperlipidemia)    H/O low back pain    History of appendectomy    Vital Signs Last 24 Hrs  T(C): 36.8 (10 Oct 2021 13:50), Max: 37.4 (09 Oct 2021 20:31)  T(F): 98.2 (10 Oct 2021 13:50), Max: 99.3 (09 Oct 2021 20:31)  HR: 64 (10 Oct 2021 14:15) (56 - 80)  BP: 126/84 (10 Oct 2021 14:15) (88/60 - 129/75)  BP(mean): 98 (10 Oct 2021 14:15) (92 - 98)  RR: 20 (10 Oct 2021 13:50) (15 - 20)  SpO2: 94% (10 Oct 2021 14:15) (88% - 98%)  Physical Exam:   General: Adult male lying in bed, diaphoretic.   HEENT: +PERRLA, EOMI, Subconjunctival hemorrhage in right eye. Conjunctiva pink and moist. MMM, Trachea midline. No JVD.  Pulm: Speaking full sentences. LS CTA bilaterally.   Cardiac: S1, S2, PMI WNL.  GI: Abdomen SNT to palpation. +Bowel sounds.  : No incontinence. Self voids.  MSK: ESTRADA X 4. +CMS X 4. Lumbar area incision open to air feels non-tender without fluctuance. No drainage.   Skin: Pale, cool, and diaphoretic. Color returned and less diaphoretic post event.  Neuro: Awake, alert, and oriented X 4.    10-09 @ 07:01  -  10-10 @ 07:00  --------------------------------------------------------  IN: 0 mL / OUT: 1700 mL / NET: -1700 mL                         11.7   7.93  )-----------( 201      ( 10 Oct 2021 08:28 )             36.6     10-10    141  |  104  |  12  ----------------------------<  104<H>  3.6   |  27  |  0.87    Ca    9.2      10 Oct 2021 08:28        MEDICATIONS  (STANDING):  acetaminophen   Tablet .. 975 milliGRAM(s) Oral every 8 hours  famotidine    Tablet 20 milliGRAM(s) Oral every 12 hours  folic acid 1 milliGRAM(s) Oral daily  lactated ringers. 1000 milliLiter(s) (100 mL/Hr) IV Continuous <Continuous>  methocarbamol 500 milliGRAM(s) Oral every 8 hours  multivitamin 1 Tablet(s) Oral daily  pregabalin 50 milliGRAM(s) Oral three times a day  senna 2 Tablet(s) Oral at bedtime  Streptomycin 1Gm/NS 0.9% 1 L 1 Application(s) 1 Application(s) Topical every 1 hour    MEDICATIONS  (PRN):  HYDROmorphone  Injectable 0.5 milliGRAM(s) IV Push every 15 minutes PRN pacu  HYDROmorphone  Injectable 0.5 milliGRAM(s) IV Push every 4 hours PRN breakthrough  magnesium hydroxide Suspension 30 milliLiter(s) Oral daily PRN Constipation  ondansetron Injectable 4 milliGRAM(s) IV Push every 6 hours PRN Nausea and/or Vomiting  traMADol 25 milliGRAM(s) Oral every 4 hours PRN Moderate Pain (4 - 6)  traMADol 50 milliGRAM(s) Oral every 4 hours PRN Severe Pain (7 - 10)    POCUS: Pulm: A-Line pattern bilaterally. No B-Lines. No effusions. Cardiac: LV Concentric. No RV dilation. No Archer's. DVT: A bedside ultrasound was conducted to assess for DVT. Bilateral lower extremities evaluated at 3 points– common femoral vein, saphenofemoral junction, and the popliteal vein. Sequential compressions at these sites showed fully compressible veins. Bladder: ~110 ml in bladder.    Assessment- Rapid Response called for 46y year old Male with a past medical history of HTN, HLD, Spinal Stenosis, chronic back pain now POD# 2 with laminectomy L2-L5 now status post RRT for syncope likely in setting of vasovagal event.     Plan-  -EKG STAT with NSR on EKG no STEMI, No S1Q3T3.  -BG WNL.  -Maintain 2 large bore IV's.   -VS per floor protocol.   -Follow up CBC, CMP, Coags, Lactate, Troponin.  -No signs of sepsis.   -Given 1 liter NS Bolus during RRT.  -Strict I and O's.   -Activity Ad Danisha with close monitoring.  -Fall Precautions.   -Incentive Spirometry.   -Hemodynamically stable, less likely cardiac event, patient ok to stay on floor.   -Rest of plan of care per primary team.     Above discussed with primary nursing team, primary ortho team, and PCCM Fellow.

## 2021-10-10 NOTE — CONSULT NOTE ADULT - PROBLEM SELECTOR RECOMMENDATION 9
Patient with episode of syncope during straining to have a bowel movement. The clinical history is typical for vasovagal syncope - bradycardia, hypotension, diaphoresis - in light of his recent operation and constipation. He has no chest pain, palpitations, EKG changes or enzymes to suggest a cardiac event. He has been ambulating and there is no lower extremity asymmetric edema or respiratory failure to suggest pulmonary embolism. Lactate is normal which argues against shock states. The event overall was transient in nature further supporting vasovagal syncope.   - no further testing indicated   - treat constipation   - patient counseled on cautious transitions between laying, sitting and standing as well as to avoid valsalva maneuvers

## 2021-10-10 NOTE — PROVIDER CONTACT NOTE (CHANGE IN STATUS NOTIFICATION) - ASSESSMENT
Unable to obtain b/p.  Patient immediately transferred back to bed and positioned in Trendelenburg position. b/p was 88/60 HR dropped to 56. desat to 88on RA.

## 2021-10-10 NOTE — DISCHARGE NOTE NURSING/CASE MANAGEMENT/SOCIAL WORK - PATIENT PORTAL LINK FT
You can access the FollowMyHealth Patient Portal offered by [facility] by registering at the following website: [email or web address]. By joining [facility]’s FollowMyHealth portal, you will also be able to view your health information using other applications (apps) compatible with our system.

## 2021-10-10 NOTE — CONSULT NOTE ADULT - SUBJECTIVE AND OBJECTIVE BOX
INTERNAL MEDICINE SERVICE INITIAL CONSULT NOTE    HPI:  47 y/o male with chronic back pain w/ radiculopahty who presented to [facility] for elective L2-L5 laminectomy and fusion. Patient is POD 2 from lumbar laminectomy for lumbar spinal fusion. Surgery was uncomplicated and he has been recovering w/o incident on orthopedic service. A rapid response was called today for syncope x2 while patient was ambulating to the bathroom. The patient is accompanied by his wife at bedside who provides corroborating information. He was feeling in his normal state of health when he walked to the bathroom and attempted to have a bowel movement. He began to strain, got light headed and lost consciousness The wife reports that he was pale, diaphoretic and there was some rhythmic motion of the jaw. No jerking of the extremities was noted. No prodrome preceded the episode. No palpitations were experienced. He is not and did not experience any chest pain prior to this event. He rapidly regained consciousness and was lucid after the fact, no post ictal period was noted. He has never had syncope before. He has been taking opiates for pain control and his last dose was at 11 AM.     REVIEW OF SYSTEMS:   Otherwise negative except as specified in HPI    PAST MEDICAL HISTORY: Hx chronic back pain    PAST SURGICAL HISTORY: No history of surgery     FAMILY HISTORY: No history of cardiomyopathy, syncope or arrythmia in either parent     SOCIAL HISTORY:  Tobacco use: denies  EtOH use: denies  Illicit drug use: denies     MEDICATIONS:  MEDICATIONS  (STANDING):  acetaminophen   Tablet .. 975 milliGRAM(s) Oral every 8 hours  famotidine    Tablet 20 milliGRAM(s) Oral every 12 hours  folic acid 1 milliGRAM(s) Oral daily  lactated ringers. 1000 milliLiter(s) (100 mL/Hr) IV Continuous <Continuous>  methocarbamol 500 milliGRAM(s) Oral every 8 hours  multivitamin 1 Tablet(s) Oral daily  pregabalin 50 milliGRAM(s) Oral three times a day  senna 2 Tablet(s) Oral at bedtime  Streptomycin 1Gm/NS 0.9% 1 L 1 Application(s) 1 Application(s) Topical every 1 hour    MEDICATIONS  (PRN):  HYDROmorphone  Injectable 0.5 milliGRAM(s) IV Push every 15 minutes PRN pacu  HYDROmorphone  Injectable 0.5 milliGRAM(s) IV Push every 4 hours PRN breakthrough  magnesium hydroxide Suspension 30 milliLiter(s) Oral daily PRN Constipation  ondansetron Injectable 4 milliGRAM(s) IV Push every 6 hours PRN Nausea and/or Vomiting  traMADol 25 milliGRAM(s) Oral every 4 hours PRN Moderate Pain (4 - 6)  traMADol 50 milliGRAM(s) Oral every 4 hours PRN Severe Pain (7 - 10)      ALLERGIES:  Allergies    No Known Allergies    Intolerances        VITAL SIGNS:  Vital Signs Last 24 Hrs  T(C): 37.8 (10 Oct 2021 15:15), Max: 37.8 (10 Oct 2021 15:15)  T(F): 100 (10 Oct 2021 15:15), Max: 100 (10 Oct 2021 15:15)  HR: 72 (10 Oct 2021 15:15) (56 - 80)  BP: 129/79 (10 Oct 2021 15:15) (88/60 - 129/79)  BP(mean): 85 (10 Oct 2021 15:15) (85 - 98)  RR: 18 (10 Oct 2021 15:15) (15 - 20)  SpO2: 100% (10 Oct 2021 15:15) (88% - 100%)    10-09-21 @ 07:01  -  10-10-21 @ 07:00  --------------------------------------------------------  IN:  Total IN: 0 mL    OUT:    Drain (mL): 150 mL    Voided (mL): 1550 mL  Total OUT: 1700 mL    Total NET: -1700 mL          PHYSICAL EXAM:  Constitutional: WD resting comfortably in bed; NAD  Head: NC/AT  ENT: no nasal discharge; uvula midline, no oropharyngeal erythema or exudates; MMM  Neck: supple; no JVD or thyromegaly  Respiratory: CTA B/L; no W/R/R, no retractions  Cardiac: +S1/S2; RRR; no M/R/G; PMI non-displaced  Gastrointestinal: abdomen soft, TTP LLQ; no rebound or guarding; +BSx4  Genitourinary: normal external genitalia  Extremities: WWP, no clubbing or cyanosis; no peripheral edema. no asymmetry   Musculoskeletal: NROM x4; no joint swelling, tenderness or erythema  Vascular: 2+ radial, femoral, DP/PT pulses B/L  Neurologic: AAOx3; CNII-XII grossly intact; no focal deficits  Psychiatric: affect and characteristics of appearance, verbalizations, behaviors are appropriate    LABS:                        12.0   12.16 )-----------( 232      ( 10 Oct 2021 14:22 )             36.4     10-10    139  |  104  |  14  ----------------------------<  144<H>  3.6   |  24  |  0.76    Ca    9.6      10 Oct 2021 14:22  Phos  4.3     10-10  Mg     2.2     10-10    TPro  7.3  /  Alb  4.1  /  TBili  0.3  /  DBili  x   /  AST  73<H>  /  ALT  103<H>  /  AlkPhos  88  10-10    PT/INR - ( 10 Oct 2021 14:22 )   PT: 12.1 sec;   INR: 1.01          PTT - ( 10 Oct 2021 14:22 )  PTT:26.3 sec    CARDIAC MARKERS ( 10 Oct 2021 14:22 )  x     / 0.01 ng/mL / x     / x     / x          CAPILLARY BLOOD GLUCOSE      POCT Blood Glucose.: 139 mg/dL (10 Oct 2021 14:02)          RADIOLOGY & ADDITIONAL TESTS:     EKG personally reviewed: NSR, normal axis. No ischemic changes

## 2021-10-11 DIAGNOSIS — R10.9 UNSPECIFIED ABDOMINAL PAIN: ICD-10-CM

## 2021-10-11 PROCEDURE — 99233 SBSQ HOSP IP/OBS HIGH 50: CPT | Mod: GC

## 2021-10-11 RX ORDER — NALOXEGOL OXALATE 12.5 MG/1
12.5 TABLET, FILM COATED ORAL ONCE
Refills: 0 | Status: DISCONTINUED | OUTPATIENT
Start: 2021-10-11 | End: 2021-10-11

## 2021-10-11 RX ADMIN — METHOCARBAMOL 500 MILLIGRAM(S): 500 TABLET, FILM COATED ORAL at 13:18

## 2021-10-11 RX ADMIN — Medication 10 MILLIGRAM(S): at 14:24

## 2021-10-11 RX ADMIN — Medication 1 TABLET(S): at 13:18

## 2021-10-11 RX ADMIN — FAMOTIDINE 20 MILLIGRAM(S): 10 INJECTION INTRAVENOUS at 16:51

## 2021-10-11 RX ADMIN — Medication 50 MILLIGRAM(S): at 21:24

## 2021-10-11 RX ADMIN — TRAMADOL HYDROCHLORIDE 50 MILLIGRAM(S): 50 TABLET ORAL at 18:57

## 2021-10-11 RX ADMIN — Medication 1 MILLIGRAM(S): at 13:18

## 2021-10-11 RX ADMIN — Medication 50 MILLIGRAM(S): at 05:33

## 2021-10-11 RX ADMIN — FAMOTIDINE 20 MILLIGRAM(S): 10 INJECTION INTRAVENOUS at 05:33

## 2021-10-11 RX ADMIN — METHOCARBAMOL 500 MILLIGRAM(S): 500 TABLET, FILM COATED ORAL at 05:33

## 2021-10-11 RX ADMIN — Medication 975 MILLIGRAM(S): at 05:33

## 2021-10-11 RX ADMIN — Medication 975 MILLIGRAM(S): at 06:33

## 2021-10-11 RX ADMIN — METHOCARBAMOL 500 MILLIGRAM(S): 500 TABLET, FILM COATED ORAL at 21:24

## 2021-10-11 RX ADMIN — Medication 50 MILLIGRAM(S): at 13:18

## 2021-10-11 RX ADMIN — SENNA PLUS 2 TABLET(S): 8.6 TABLET ORAL at 21:24

## 2021-10-11 RX ADMIN — TRAMADOL HYDROCHLORIDE 50 MILLIGRAM(S): 50 TABLET ORAL at 19:57

## 2021-10-11 RX ADMIN — MAGNESIUM HYDROXIDE 30 MILLILITER(S): 400 TABLET, CHEWABLE ORAL at 11:19

## 2021-10-11 NOTE — PROGRESS NOTE ADULT - PROBLEM SELECTOR PLAN 4
2/2 recent spinal surgery, stable. No further blood loss noted.   - Monitor CBC      # Leukocytosis  Likely reactive post-op. No signs of new infection.  - Monitor CBC

## 2021-10-11 NOTE — PROGRESS NOTE ADULT - SUBJECTIVE AND OBJECTIVE BOX
Ortho Note    Subjective:  Pt seen and examined on morning rounds. Denies any back pain, leg pain, or episodes of dizziness after rapid response yesterday.  Denies CP, SOB, N/V, numbness/tingling     Vital Signs Last 24 Hrs  T(C): 37.8 (11 Oct 2021 05:38), Max: 37.8 (10 Oct 2021 15:15)  T(F): 100 (11 Oct 2021 05:38), Max: 100 (10 Oct 2021 15:15)  HR: 74 (11 Oct 2021 05:38) (56 - 80)  BP: 125/80 (11 Oct 2021 05:38) (88/60 - 129/79)  BP(mean): 85 (10 Oct 2021 15:15) (85 - 98)  RR: 18 (11 Oct 2021 05:38) (18 - 20)  SpO2: 95% (11 Oct 2021 05:38) (88% - 100%)    Physical Exam:  General: Pt Alert and oriented, NAD  DSG C/D/I  Pulses: 2+ dp, pt pulses, wwp, cap refill <3 seconds  Sensation: SILT sural/saph/sp/dp/ tibial distributions  Motor: 5/5 EHL/FHL/TA/GS    A/P: 46yMale POD#3 s/p Lami L2-L5.  - Stable  - Pain Control  - DVT ppx: SCDs  - PT, WBS: WBAT  - Medicine/PT clearance before discharge  - Dispo: home today

## 2021-10-11 NOTE — PROGRESS NOTE ADULT - ATTENDING COMMENTS
46M w h/o HTN, HLD, Obesity (33), lumbar spinal stenosis here for elective laminectomy w Dr. Carrillo 10/8 - post-op course c/b vasovagal syncope 10/10 and today w lower abdominal pain    Pt seen and examined. Wife at bedside. He reports feeling rather uncomfortable reporting discomfort and bloating in lower abdomen today. +Flatus. Voiding wo dysuria. No nausea, abd pain. No further lightheadedness or dizziness. Exam notable for large abdomen, NABS, discomfort on palpation in lower quadrants wo rebound. Negative Quinones's sign.    #Post-op state - back pain controlled. PPx: SCDs. On Bowel regimen and incentive spirometer  #Constipation - discomfort in lower abdomen - likely 2/2 constipation vs urinary retention (wife states pt has voided less today vs yesterday). Benign abd exam w +Flatus making obstruction less likely. No Quinones's sign - less likely hepatobiliary process  #Vasovagal syncope - appears to have resolved. Cleared PT this AM  #Elevated Transaminases - appears to be chronic and noted to have elevation w TBil 1.0 pre-operatively    Recommendations  -Bladder scan.     Reassessed later in afternoon after pt voided (bladder scan 410cc) and had BM. Overall feels much more comfortable  -from medical standpoint, optimized for disposition  -discussed importance of adequate hydration and using PRN laxatives, stool softeners to ensure BM. Also counseled to urinate standing up if able   -Discussed baseline elevated AST/ALT w improvement in TBil. Recommend to follow-up as outpatient PCP and obtain RUQ US as outpatient. Likely     Co-Management will continue to follow  Dispo: PT recommended home no needs

## 2021-10-11 NOTE — PROGRESS NOTE ADULT - PROBLEM SELECTOR PLAN 5
- patient instructed on ICS use  - patient needs aggressive bowel regimen to facilitate BM given opiates: would add miralax + senna and consider a dulcolax suppository  - VTE prophylaxis with ASA.

## 2021-10-11 NOTE — PROGRESS NOTE ADULT - PROBLEM SELECTOR PLAN 2
2/2 constipation x 3 days, now improved after bowel movement.  - Continue with bowel regimen: senna 2 tabs qHS, miralax daily, prn dulcolax suppository

## 2021-10-11 NOTE — PROGRESS NOTE ADULT - PROBLEM SELECTOR PLAN 1
Mild AST/ALT elevation to 73/103 on 10/10. Bilirubin and alk phos wnl. Reports drinking about 8 beers about 2x monthly on weekends, denies daily alcohol use or drug use. Denies h/o liver disease or hepatitis. Recent pre-op labs with also mild AST/ALT elevation 44/77, total bili 0.6 and alk phos wnl. Likely related to MEDINA given patient's habitus.  - Recommend additional outpatient workup with RUQ ultrasound and repeat LFTs within 2 weeks of discharge  - Can continue Tylenol for short-term pain control at a conservative maximum of 3 grams/24hrs until repeat LFTs  - Recommend alcohol cessation

## 2021-10-11 NOTE — PROGRESS NOTE ADULT - SUBJECTIVE AND OBJECTIVE BOX
Ortho Note      Subjective:  Pt comfortable without complaints, pain controlled with current pain medication regimen.   Denies CP, SOB, N/V, numbness/tingling       Vital Signs Last 24 Hrs  T(C): 36.8 (10-11-21 @ 08:15), Max: 37.8 (10-11-21 @ 05:38)  T(F): 98.3 (10-11-21 @ 08:15), Max: 100 (10-11-21 @ 05:38)  HR: 79 (10-11-21 @ 08:15) (74 - 79)  BP: 137/83 (10-11-21 @ 08:15) (125/80 - 137/83)  BP(mean): --  RR: 19 (10-11-21 @ 08:15) (18 - 19)  SpO2: 98% (10-11-21 @ 08:15) (95% - 98%)  AVSS    Objective:    Physical Exam:  General: Pt Alert and oriented, NAD  Lumbar incision DSG C/D/I  Pulses: +2 pedal pulses, wwp toes, cap refill less than 3 seconds  Sensation: silt intact  Motor: EHL/FHL/TA/GS- firing        Plan of Care:  A/P: 46yMale POD#3 s/p L2-L5 laminectomy   - afebrile, nontoxic apperance  - Pain Control- lyrica 50mg PO TID, methocarbamol 500mg PO Q8h, tramadol 25-50mg PO Q4h prn moderate to severe pain  - DVT ppx: scds  - PT, WBS: WBAT  - Medicine Consult-  - elevated lfts- discontinue hepatoxic medications, d/c tylenol  - bowel regimen, IS use, PPI  - Dispo- home pending medical clearance    Ortho Pager [phone]

## 2021-10-11 NOTE — PROGRESS NOTE ADULT - PROBLEM SELECTOR PLAN 3
Patient with episode of syncope during straining to have a bowel movement. The clinical history is typical for vasovagal syncope - bradycardia, hypotension, diaphoresis - in light of his recent operation and constipation. He has no chest pain, palpitations, EKG changes or enzymes to suggest a cardiac event. He has been ambulating and there is no lower extremity asymmetric edema or respiratory failure to suggest pulmonary embolism. Lactate is normal which argues against shock states. The event overall was transient in nature further supporting vasovagal syncope.   - no further testing indicated   - patient counseled on cautious transitions between laying, sitting and standing as well as to avoid valsalva maneuvers

## 2021-10-11 NOTE — PROGRESS NOTE ADULT - SUBJECTIVE AND OBJECTIVE BOX
OVERNIGHT EVENTS: NAEO.    SUBJECTIVE / INTERVAL HPI: Patient seen and examined at bedside. C/o lower abdomen discomfort he is attributing to constipation. No BM in 3 days. Today he also c/o weak urinary stream. Denies f/c, n/v, HA, chest pain, SOB, dysuria, numbness/tingling.    MEDICATIONS  (STANDING):  famotidine    Tablet 20 milliGRAM(s) Oral every 12 hours  folic acid 1 milliGRAM(s) Oral daily  lactated ringers. 1000 milliLiter(s) (100 mL/Hr) IV Continuous <Continuous>  methocarbamol 500 milliGRAM(s) Oral every 8 hours  multivitamin 1 Tablet(s) Oral daily  pregabalin 50 milliGRAM(s) Oral three times a day  senna 2 Tablet(s) Oral at bedtime  Streptomycin 1Gm/NS 0.9% 1 L 1 Application(s) 1 Application(s) Topical every 1 hour    MEDICATIONS  (PRN):  bisacodyl Suppository 10 milliGRAM(s) Rectal daily PRN Constipation  HYDROmorphone  Injectable 0.5 milliGRAM(s) IV Push every 15 minutes PRN pacu  HYDROmorphone  Injectable 0.5 milliGRAM(s) IV Push every 4 hours PRN breakthrough  magnesium hydroxide Suspension 30 milliLiter(s) Oral daily PRN Constipation  ondansetron Injectable 4 milliGRAM(s) IV Push every 6 hours PRN Nausea and/or Vomiting  traMADol 25 milliGRAM(s) Oral every 4 hours PRN Moderate Pain (4 - 6)  traMADol 50 milliGRAM(s) Oral every 4 hours PRN Severe Pain (7 - 10)    Allergies    No Known Allergies    Intolerances        VITAL SIGNS:  Vital Signs Last 24 Hrs  T(C): 36.6 (11 Oct 2021 15:43), Max: 37.8 (11 Oct 2021 05:38)  T(F): 97.9 (11 Oct 2021 15:43), Max: 100 (11 Oct 2021 05:38)  HR: 75 (11 Oct 2021 15:43) (74 - 79)  BP: 130/- (11 Oct 2021 15:43) (125/80 - 137/83)  BP(mean): 81 (11 Oct 2021 15:43) (81 - 81)  RR: 18 (11 Oct 2021 15:43) (18 - 20)  SpO2: 97% (11 Oct 2021 15:43) (95% - 99%)      10-10-21 @ 07:01  -  10-11-21 @ 07:00  --------------------------------------------------------  IN: 600 mL / OUT: 0 mL / NET: 600 mL        PHYSICAL EXAM:  General: NAD, Laying in bed appearing uncomfortable  HEENT: NC/AT, anicteric sclera, MMM  Neck: supple  Cardiovascular: +S1/S2, RRR, No murmurs, rubs, gallops  Respiratory: CTA B/L, no W/R/R  Gastrointestinal: soft, moderately distended, mild TTP in b/l lower quadrant and suprapubic area, +BSx4  Extremities: WWP, no edema, clubbing or cyanosis  Vascular: 2+ radial, DP/PT pulses B/L  Neurological: AAOx3, no focal deficits      LABS:                        12.0   12.16 )-----------( 232      ( 10 Oct 2021 14:22 )             36.4     10-10    139  |  104  |  14  ----------------------------<  144<H>  3.6   |  24  |  0.76    Ca    9.6      10 Oct 2021 14:22  Phos  4.3     10-10  Mg     2.2     10-10    TPro  7.3  /  Alb  4.1  /  TBili  0.3  /  DBili  x   /  AST  73<H>  /  ALT  103<H>  /  AlkPhos  88  10-10    PT/INR - ( 10 Oct 2021 14:22 )   PT: 12.1 sec;   INR: 1.01     PTT - ( 10 Oct 2021 14:22 )  PTT:26.3 sec        RADIOLOGY & ADDITIONAL TESTS: Reviewed.

## 2021-10-12 VITALS — SYSTOLIC BLOOD PRESSURE: 143 MMHG | DIASTOLIC BLOOD PRESSURE: 82 MMHG

## 2021-10-12 LAB
ANION GAP SERPL CALC-SCNC: 11 MMOL/L — SIGNIFICANT CHANGE UP (ref 5–17)
BUN SERPL-MCNC: 13 MG/DL — SIGNIFICANT CHANGE UP (ref 7–23)
CALCIUM SERPL-MCNC: 9.5 MG/DL — SIGNIFICANT CHANGE UP (ref 8.4–10.5)
CHLORIDE SERPL-SCNC: 101 MMOL/L — SIGNIFICANT CHANGE UP (ref 96–108)
CO2 SERPL-SCNC: 26 MMOL/L — SIGNIFICANT CHANGE UP (ref 22–31)
CREAT SERPL-MCNC: 0.76 MG/DL — SIGNIFICANT CHANGE UP (ref 0.5–1.3)
GLUCOSE SERPL-MCNC: 107 MG/DL — HIGH (ref 70–99)
HCT VFR BLD CALC: 36.4 % — LOW (ref 39–50)
HGB BLD-MCNC: 11.8 G/DL — LOW (ref 13–17)
MCHC RBC-ENTMCNC: 28.9 PG — SIGNIFICANT CHANGE UP (ref 27–34)
MCHC RBC-ENTMCNC: 32.4 GM/DL — SIGNIFICANT CHANGE UP (ref 32–36)
MCV RBC AUTO: 89 FL — SIGNIFICANT CHANGE UP (ref 80–100)
NRBC # BLD: 0 /100 WBCS — SIGNIFICANT CHANGE UP (ref 0–0)
PLATELET # BLD AUTO: 237 K/UL — SIGNIFICANT CHANGE UP (ref 150–400)
POTASSIUM SERPL-MCNC: 3.7 MMOL/L — SIGNIFICANT CHANGE UP (ref 3.5–5.3)
POTASSIUM SERPL-SCNC: 3.7 MMOL/L — SIGNIFICANT CHANGE UP (ref 3.5–5.3)
RBC # BLD: 4.09 M/UL — LOW (ref 4.2–5.8)
RBC # FLD: 12.1 % — SIGNIFICANT CHANGE UP (ref 10.3–14.5)
SODIUM SERPL-SCNC: 138 MMOL/L — SIGNIFICANT CHANGE UP (ref 135–145)
WBC # BLD: 7.64 K/UL — SIGNIFICANT CHANGE UP (ref 3.8–10.5)
WBC # FLD AUTO: 7.64 K/UL — SIGNIFICANT CHANGE UP (ref 3.8–10.5)

## 2021-10-12 PROCEDURE — 99232 SBSQ HOSP IP/OBS MODERATE 35: CPT

## 2021-10-12 RX ADMIN — FAMOTIDINE 20 MILLIGRAM(S): 10 INJECTION INTRAVENOUS at 05:47

## 2021-10-12 RX ADMIN — Medication 50 MILLIGRAM(S): at 05:47

## 2021-10-12 RX ADMIN — METHOCARBAMOL 500 MILLIGRAM(S): 500 TABLET, FILM COATED ORAL at 05:47

## 2021-10-12 NOTE — PROGRESS NOTE ADULT - SUBJECTIVE AND OBJECTIVE BOX
Ortho Note    Subjective:  Pt seen and examined on morning rounds. Denies any back pain, leg pain, or episodes of dizziness. Has been ambulating freely.  Denies CP, SOB, N/V, numbness/tingling     Vital Signs Last 24 Hrs  T(C): 36.9 (12 Oct 2021 05:55), Max: 37.3 (11 Oct 2021 20:15)  T(F): 98.5 (12 Oct 2021 05:55), Max: 99.1 (11 Oct 2021 20:15)  HR: 71 (12 Oct 2021 05:55) (71 - 79)  BP: 133/85 (12 Oct 2021 05:55) (130/- - 137/83)  BP(mean): 101 (12 Oct 2021 05:55) (81 - 102)  RR: 18 (12 Oct 2021 05:55) (18 - 19)  SpO2: 93% (12 Oct 2021 05:55) (93% - 98%)    Physical Exam:  General: Pt Alert and oriented, NAD  DSG C/D/I  Pulses: 2+ dp, pt pulses, wwp, cap refill <3 seconds  Sensation: SILT sural/saph/sp/dp/ tibial distributions  Motor: 5/5 EHL/FHL/TA/GS    A/P: 46yMale s/p Lami L2-L5 on 10/8.  - Stable  - Pain Control  - DVT ppx: SCDs  - PT, WBS: WBAT  - Dispo: home today

## 2021-10-12 NOTE — PROGRESS NOTE ADULT - SUBJECTIVE AND OBJECTIVE BOX
INTERVAL HPI/OVERNIGHT EVENTS: ALICE O/N    SUBJECTIVE: Patient seen and examined at bedside.   Pt had large BM overnight. Also voided wo any dysuria. Overall feeling much better and states lower abd discomfort has improved. Seated in chair w wife at bedside today. Denies any fever, chest pain, palpitations, dyspnea. No lightheadedness or dizziness while walking.     OBJECTIVE:    VITAL SIGNS:  ICU Vital Signs Last 24 Hrs  T(C): 36.9 (12 Oct 2021 08:15), Max: 37.3 (11 Oct 2021 20:15)  T(F): 98.4 (12 Oct 2021 08:15), Max: 99.1 (11 Oct 2021 20:15)  HR: 84 (12 Oct 2021 08:15) (71 - 84)  BP: 143/82 (12 Oct 2021 10:50) (131/88 - 143/82)  BP(mean): 101 (12 Oct 2021 05:55) (101 - 102)  ABP: --  ABP(mean): --  RR: 17 (12 Oct 2021 08:15) (17 - 19)  SpO2: 98% (12 Oct 2021 08:15) (93% - 98%)        10-11 @ 07:01  -  10-12 @ 07:00  --------------------------------------------------------  IN: 800 mL / OUT: 600 mL / NET: 200 mL    10-12 @ 07:01  -  10-12 @ 16:00  --------------------------------------------------------  IN: 240 mL / OUT: 200 mL / NET: 40 mL      CAPILLARY BLOOD GLUCOSE          PHYSICAL EXAM:  GEN: Male in NAD on RA  HEENT: NC/AT, MMM  NECK: Supple  CV: RRR, nml S1S2, no murmurs  PULM: nml effort, CTAB  ABD: Soft, non-distended, NABS, non-tender  NEURO  A/O x3, moving all extremities, Sensation intact  5/5 in BLE.   Lumbar dressing c/d/i  PSYCH: Appropriate      MEDICATIONS:  MEDICATIONS  (STANDING):  famotidine    Tablet 20 milliGRAM(s) Oral every 12 hours  folic acid 1 milliGRAM(s) Oral daily  lactated ringers. 1000 milliLiter(s) (100 mL/Hr) IV Continuous <Continuous>  methocarbamol 500 milliGRAM(s) Oral every 8 hours  multivitamin 1 Tablet(s) Oral daily  pregabalin 50 milliGRAM(s) Oral three times a day  senna 2 Tablet(s) Oral at bedtime  Streptomycin 1Gm/NS 0.9% 1 L 1 Application(s) 1 Application(s) Topical every 1 hour    MEDICATIONS  (PRN):  bisacodyl Suppository 10 milliGRAM(s) Rectal daily PRN Constipation  HYDROmorphone  Injectable 0.5 milliGRAM(s) IV Push every 15 minutes PRN pacu  HYDROmorphone  Injectable 0.5 milliGRAM(s) IV Push every 4 hours PRN breakthrough  magnesium hydroxide Suspension 30 milliLiter(s) Oral daily PRN Constipation  ondansetron Injectable 4 milliGRAM(s) IV Push every 6 hours PRN Nausea and/or Vomiting  traMADol 25 milliGRAM(s) Oral every 4 hours PRN Moderate Pain (4 - 6)  traMADol 50 milliGRAM(s) Oral every 4 hours PRN Severe Pain (7 - 10)      ALLERGIES:  Allergies    No Known Allergies    Intolerances        LABS:                        11.8   7.64  )-----------( 237      ( 12 Oct 2021 07:46 )             36.4     10-12    138  |  101  |  13  ----------------------------<  107<H>  3.7   |  26  |  0.76    Ca    9.5      12 Oct 2021 07:46            RADIOLOGY & ADDITIONAL TESTS: Reviewed.

## 2021-10-12 NOTE — PROGRESS NOTE ADULT - PROVIDER SPECIALTY LIST ADULT
Hospitalist
Orthopedics
Internal Medicine
Orthopedics

## 2021-10-12 NOTE — PROGRESS NOTE ADULT - ASSESSMENT
46M w h/o HTN, HLD, Obesity (33), lumbar spinal stenosis here for elective L2-L5 laminectomy w Dr. Carrillo 10/8 - post-op course c/b vasovagal syncope 10/10 and lower abdominal pain - now improved.    #Post-op state - back pain controlled. PPx: SCDs. On Bowel regimen and incentive spirometer  #Constipation - Appears to have resolved   #Vasovagal syncope - appears to have resolved. No symptoms  #Elevated Transaminases - appears to be chronic and noted to have elevation w TBil 1.0 pre-operatively    Recommendations  -from medical standpoint, optimized for disposition  -discussed importance of adequate hydration and using PRN laxatives, stool softeners to ensure BM.   -Discussed baseline elevated AST/ALT w improvement in TBil. Recommend to follow-up as outpatient PCP and obtain RUQ US as outpatient.     DISPO: Home no needs  
47 y/o male with HTN, HLD, obesity, lumbar spinal stenosis presenting for elective laminectomy now POD 3, medicine team initially consulted 10/10 for vasovagal event, now reconsulted 10/11 for elevated LFTs.    Recommendations below discussed with attending. Medicine consult team to sign off, please reconsult as needed.

## 2021-10-12 NOTE — PROGRESS NOTE ADULT - SUBJECTIVE AND OBJECTIVE BOX
Ortho Note      Subjective:  Pt comfortable without complaints, pain controlled with current pain medication regimen. Patient reports having a large bm yesterday.   Denies CP, SOB, N/V, numbness/tingling       Vital Signs Last 24 Hrs  T(C): 36.9 (10-12-21 @ 08:15), Max: 36.9 (10-12-21 @ 05:55)  T(F): 98.4 (10-12-21 @ 08:15), Max: 98.5 (10-12-21 @ 05:55)  HR: 84 (10-12-21 @ 08:15) (71 - 84)  BP: 132/88 (10-12-21 @ 08:15) (132/88 - 133/85)  BP(mean): 101 (10-12-21 @ 05:55) (101 - 101)  RR: 17 (10-12-21 @ 08:15) (17 - 18)  SpO2: 98% (10-12-21 @ 08:15) (93% - 98%)  AVSS      Objective:    Physical Exam:  General: Pt Alert and oriented, NAD  Lumbar incision C/D/I  Pulses: +2 pedal pulses, wwp toes, cap refill less than 3 seconds  Sensation: silt intact  Motor: EHL/FHL/TA/GS- firing        Plan of Care:  A/P: 46yMale POD#4 s/p L2-L5 Laminectomy   - afebrile, nontoxic apperance  - Pain Control- Dilaudid 0.5mg Q4h prn breakthrough pain, tramadol 25-50mg PO Q4h prn moderate to severe pain, methocarbamol 500mg PO Q8h prn muscle spasms, lyrica 50mg PO TID  - DVT ppx: scds  - PT, WBS: WBAT  - bowel regimen, IS use  - Dispo- dc home today pending pt clearance    Ortho Pager [phone]

## 2021-10-13 ENCOUNTER — NON-APPOINTMENT (OUTPATIENT)
Age: 46
End: 2021-10-13

## 2021-10-18 DIAGNOSIS — E66.9 OBESITY, UNSPECIFIED: ICD-10-CM

## 2021-10-18 DIAGNOSIS — R79.89 OTHER SPECIFIED ABNORMAL FINDINGS OF BLOOD CHEMISTRY: ICD-10-CM

## 2021-10-18 DIAGNOSIS — K59.00 CONSTIPATION, UNSPECIFIED: ICD-10-CM

## 2021-10-18 DIAGNOSIS — R00.1 BRADYCARDIA, UNSPECIFIED: ICD-10-CM

## 2021-10-18 DIAGNOSIS — R33.9 RETENTION OF URINE, UNSPECIFIED: ICD-10-CM

## 2021-10-18 DIAGNOSIS — R55 SYNCOPE AND COLLAPSE: ICD-10-CM

## 2021-10-18 DIAGNOSIS — M48.062 SPINAL STENOSIS, LUMBAR REGION WITH NEUROGENIC CLAUDICATION: ICD-10-CM

## 2021-10-18 DIAGNOSIS — E78.5 HYPERLIPIDEMIA, UNSPECIFIED: ICD-10-CM

## 2021-10-18 DIAGNOSIS — M54.16 RADICULOPATHY, LUMBAR REGION: ICD-10-CM

## 2021-10-18 DIAGNOSIS — I10 ESSENTIAL (PRIMARY) HYPERTENSION: ICD-10-CM

## 2021-10-18 DIAGNOSIS — M62.830 MUSCLE SPASM OF BACK: ICD-10-CM

## 2021-10-18 DIAGNOSIS — D72.828 OTHER ELEVATED WHITE BLOOD CELL COUNT: ICD-10-CM

## 2021-10-18 DIAGNOSIS — D62 ACUTE POSTHEMORRHAGIC ANEMIA: ICD-10-CM

## 2021-10-18 DIAGNOSIS — I95.9 HYPOTENSION, UNSPECIFIED: ICD-10-CM

## 2021-10-18 DIAGNOSIS — K75.81 NONALCOHOLIC STEATOHEPATITIS (NASH): ICD-10-CM

## 2021-10-28 PROCEDURE — 86901 BLOOD TYPING SEROLOGIC RH(D): CPT

## 2021-10-28 PROCEDURE — 85025 COMPLETE CBC W/AUTO DIFF WBC: CPT

## 2021-10-28 PROCEDURE — 83735 ASSAY OF MAGNESIUM: CPT

## 2021-10-28 PROCEDURE — 83605 ASSAY OF LACTIC ACID: CPT

## 2021-10-28 PROCEDURE — 97161 PT EVAL LOW COMPLEX 20 MIN: CPT

## 2021-10-28 PROCEDURE — 80048 BASIC METABOLIC PNL TOTAL CA: CPT

## 2021-10-28 PROCEDURE — 85610 PROTHROMBIN TIME: CPT

## 2021-10-28 PROCEDURE — 84484 ASSAY OF TROPONIN QUANT: CPT

## 2021-10-28 PROCEDURE — 86900 BLOOD TYPING SEROLOGIC ABO: CPT

## 2021-10-28 PROCEDURE — 80053 COMPREHEN METABOLIC PANEL: CPT

## 2021-10-28 PROCEDURE — 86850 RBC ANTIBODY SCREEN: CPT

## 2021-10-28 PROCEDURE — 76000 FLUOROSCOPY <1 HR PHYS/QHP: CPT

## 2021-10-28 PROCEDURE — 82962 GLUCOSE BLOOD TEST: CPT

## 2021-10-28 PROCEDURE — 85730 THROMBOPLASTIN TIME PARTIAL: CPT

## 2021-10-28 PROCEDURE — 84100 ASSAY OF PHOSPHORUS: CPT

## 2021-10-28 PROCEDURE — 86769 SARS-COV-2 COVID-19 ANTIBODY: CPT

## 2021-10-28 PROCEDURE — 97530 THERAPEUTIC ACTIVITIES: CPT

## 2021-10-28 PROCEDURE — 85027 COMPLETE CBC AUTOMATED: CPT

## 2021-10-28 PROCEDURE — 97116 GAIT TRAINING THERAPY: CPT

## 2021-10-28 PROCEDURE — 36415 COLL VENOUS BLD VENIPUNCTURE: CPT

## 2021-11-02 ENCOUNTER — APPOINTMENT (OUTPATIENT)
Dept: ORTHOPEDIC SURGERY | Facility: CLINIC | Age: 46
End: 2021-11-02
Payer: COMMERCIAL

## 2021-11-02 PROBLEM — M48.00 SPINAL STENOSIS, SITE UNSPECIFIED: Chronic | Status: ACTIVE | Noted: 2021-10-07

## 2021-11-02 PROBLEM — Z87.39 PERSONAL HISTORY OF OTHER DISEASES OF THE MUSCULOSKELETAL SYSTEM AND CONNECTIVE TISSUE: Chronic | Status: ACTIVE | Noted: 2021-10-07

## 2021-11-02 PROBLEM — I10 ESSENTIAL (PRIMARY) HYPERTENSION: Chronic | Status: ACTIVE | Noted: 2021-10-07

## 2021-11-02 PROBLEM — E78.5 HYPERLIPIDEMIA, UNSPECIFIED: Chronic | Status: ACTIVE | Noted: 2021-10-07

## 2021-11-02 PROCEDURE — 99024 POSTOP FOLLOW-UP VISIT: CPT

## 2021-11-05 NOTE — HISTORY OF PRESENT ILLNESS
[de-identified] : 3 weeks post op [de-identified] : s/p L2-L5 laminectomies, doing very well. Lower extremity pain resolved from preop. Low back pain minimal. Ambulating about 1.5 miles now, only able to walk 2 blocks preop. Off pain medication. Denies fever/chills/drainage from incision.  [de-identified] : Well healed incision, no erythema, drainage or warmth\par 5/5 strength L2-S1 b/l\par SILT L2-S1 b/l\par wwp\par neg homans b/l [de-identified] : no new imaging [de-identified] : s/p L2-L5 laminectomies, doing very well. Lower extremity pain resolved from preop. Low back pain minimal. Off pain meds. No sign of infection. [de-identified] : Dressing and suture tails removed\par Wound and activity instructions given\par Follow up in 3-4 weeks, sooner if there is an issue\par XR AP/lateral lumbar at that time\par All questions answered

## 2021-11-29 ENCOUNTER — APPOINTMENT (OUTPATIENT)
Dept: ORTHOPEDIC SURGERY | Facility: CLINIC | Age: 46
End: 2021-11-29
Payer: COMMERCIAL

## 2021-11-29 ENCOUNTER — OUTPATIENT (OUTPATIENT)
Dept: OUTPATIENT SERVICES | Facility: HOSPITAL | Age: 46
LOS: 1 days | End: 2021-11-29
Payer: COMMERCIAL

## 2021-11-29 ENCOUNTER — RESULT REVIEW (OUTPATIENT)
Age: 46
End: 2021-11-29

## 2021-11-29 DIAGNOSIS — Z98.890 OTHER SPECIFIED POSTPROCEDURAL STATES: ICD-10-CM

## 2021-11-29 DIAGNOSIS — Z90.49 ACQUIRED ABSENCE OF OTHER SPECIFIED PARTS OF DIGESTIVE TRACT: Chronic | ICD-10-CM

## 2021-11-29 PROCEDURE — 72100 X-RAY EXAM L-S SPINE 2/3 VWS: CPT | Mod: 26

## 2021-11-29 PROCEDURE — 99024 POSTOP FOLLOW-UP VISIT: CPT

## 2021-11-29 PROCEDURE — 72100 X-RAY EXAM L-S SPINE 2/3 VWS: CPT

## 2021-11-29 NOTE — HISTORY OF PRESENT ILLNESS
[de-identified] : 7 weeks post op [de-identified] : s/p L2-L5 laminectomies, doing very well. Lower extremity pain resolved from preop. Low back pain minimal. Ambulating about 2-3 miles now, only able to walk 2 blocks preop. Off pain medication. Denies fever/chills/drainage from incision.  [de-identified] : Well healed incision, no erythema, drainage or warmth\par 5/5 strength L2-S1 b/l\par SILT L2-S1 b/l\par wwp\par neg homans b/l [de-identified] : XR 11/29/21: alignment unchanged from intraop [de-identified] : s/p L2-L5 laminectomies, doing very well. Lower extremity pain resolved from preop. Low back pain minimal. Off pain meds. No sign of infection. [de-identified] : Wound and activity instructions given\par Will likely be able to return to work in 2 months\par Follow up in 6 weeks, sooner if there is an issue\par All questions answered

## 2022-01-18 ENCOUNTER — APPOINTMENT (OUTPATIENT)
Dept: ORTHOPEDIC SURGERY | Facility: CLINIC | Age: 47
End: 2022-01-18

## 2022-02-16 ENCOUNTER — APPOINTMENT (OUTPATIENT)
Dept: ORTHOPEDIC SURGERY | Facility: CLINIC | Age: 47
End: 2022-02-16
Payer: COMMERCIAL

## 2022-02-16 DIAGNOSIS — M48.061 SPINAL STENOSIS, LUMBAR REGION WITHOUT NEUROGENIC CLAUDICATION: ICD-10-CM

## 2022-02-16 PROCEDURE — 99214 OFFICE O/P EST MOD 30 MIN: CPT

## 2022-02-17 NOTE — HISTORY OF PRESENT ILLNESS
[de-identified] : Follow up 2/16/22: Patient presents today for a follow up. 3 months s/p L2-L5 laminectomies, doing very well. Reports significant improvement to pain from pre-op. He has not had any pain. He has tried lifting light weight with no problems. He works in construction. He is not going to PT and is just walking. He has not taken any medications for the back. Denies numbness or paresthesias. \par \par 11/29/21: s/p L2-L5 laminectomies, doing very well. Lower extremity pain resolved from preop. Low back pain minimal. Ambulating about 2-3 miles now, only able to walk 2 blocks preop. Off pain medication. Denies fever/chills/drainage from incision.

## 2022-02-17 NOTE — ADDENDUM
[FreeTextEntry1] : Documented by Haritha Hall acting as scribe for Dr. Carrillo on 02/16/2022 \par \par All Medical record entries made by the Scribe were at my, Dr. Carrillo's, direction and personally dictated by me on 02/16/2022. I have reviewed the chart and agree that the record accurately reflects my personal performance of the history, physical exam, assessment and plan. I have also personally directed, reviewed, and agreed with the discharge instructions.\par

## 2022-02-17 NOTE — PHYSICAL EXAM
[de-identified] : Well healed incision, no erythema, drainage or warmth\par no tenderness along the incision\par forward flexion is full and painless\par extension is painless\par painless side bending\par 5/5 strength L2-S1 b/l\par SILT L2-S1 b/l\par wwp\par neg homans b/l [de-identified] : XR 11/29/21: alignment unchanged from intraop

## 2022-02-17 NOTE — DISCUSSION/SUMMARY
[de-identified] : Diagnosis: 3 months s/p L2-L5 laminectomies for spinal stenosis, doing very well\par \par Patient has had significant improvement in his pre-op pain syndrome. He denies any back or leg pain on a usual basis at this point. 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 I do recommend that he have a course of PT which we will prescribe today before returning to work for 6 weeks. We will prescribe him also a soft LSO brace that he can wear when he goes to work to give him a little bit of support. He will return for FU prn. All questions answered.

## 2024-11-14 ENCOUNTER — OFFICE VISIT (OUTPATIENT)
Dept: URBAN - NONMETROPOLITAN AREA CLINIC 4 | Facility: CLINIC | Age: 49
End: 2024-11-14

## 2024-11-15 ENCOUNTER — OFFICE VISIT (OUTPATIENT)
Dept: URBAN - NONMETROPOLITAN AREA CLINIC 4 | Facility: CLINIC | Age: 49
End: 2024-11-15

## 2024-11-20 ENCOUNTER — LAB OUTSIDE AN ENCOUNTER (OUTPATIENT)
Dept: URBAN - NONMETROPOLITAN AREA CLINIC 4 | Facility: CLINIC | Age: 49
End: 2024-11-20

## 2024-11-20 ENCOUNTER — DASHBOARD ENCOUNTERS (OUTPATIENT)
Age: 49
End: 2024-11-20

## 2024-11-20 ENCOUNTER — OFFICE VISIT (OUTPATIENT)
Dept: URBAN - NONMETROPOLITAN AREA CLINIC 4 | Facility: CLINIC | Age: 49
End: 2024-11-20
Payer: COMMERCIAL

## 2024-11-20 VITALS
TEMPERATURE: 98 F | DIASTOLIC BLOOD PRESSURE: 77 MMHG | SYSTOLIC BLOOD PRESSURE: 142 MMHG | BODY MASS INDEX: 29.62 KG/M2 | WEIGHT: 200 LBS | HEART RATE: 77 BPM | HEIGHT: 69 IN

## 2024-11-20 DIAGNOSIS — Z12.11 SCREENING FOR COLON CANCER: ICD-10-CM

## 2024-11-20 PROCEDURE — 99203 OFFICE O/P NEW LOW 30 MIN: CPT | Performed by: REGISTERED NURSE

## 2024-11-20 NOTE — HPI-TODAY'S VISIT:
11/20/24: Pt is a 50 yo male with PMH of HTN who was referred by Balbir Vogel NP, for screening colonoscopy.  A copy of this document will be sent to the referring provider.    The patient presents for a colon cancer screening. There is no family history of colon polyps or cancer. Patient denies change in bowel habits, appetite and weight. Patient denies bleeding per rectum. Patient denies cardiopulmonary disease, intake of blood thinners or problems with anesthesia. Recent blood work normal.

## 2025-01-08 ENCOUNTER — CLAIMS CREATED FROM THE CLAIM WINDOW (OUTPATIENT)
Dept: URBAN - METROPOLITAN AREA SURGERY CENTER 13 | Facility: SURGERY CENTER | Age: 50
End: 2025-01-08
Payer: COMMERCIAL

## 2025-01-08 DIAGNOSIS — Z12.11 ENCOUNTER FOR SCREENING FOR MALIGNANT NEOPLASM OF COLON: ICD-10-CM

## 2025-01-08 DIAGNOSIS — Z12.11 COLON CANCER SCREENING: ICD-10-CM

## 2025-01-08 PROCEDURE — G0121 COLON CA SCRN NOT HI RSK IND: HCPCS | Performed by: INTERNAL MEDICINE

## 2025-01-08 PROCEDURE — 00812 ANES LWR INTST SCR COLSC: CPT | Performed by: ANESTHESIOLOGY

## 2025-01-08 PROCEDURE — 0528F RCMND FLW-UP 10 YRS DOCD: CPT | Performed by: INTERNAL MEDICINE

## 2025-01-08 PROCEDURE — 00812 ANES LWR INTST SCR COLSC: CPT | Performed by: ANESTHESIOLOGIST ASSISTANT
